# Patient Record
Sex: FEMALE | Race: WHITE | NOT HISPANIC OR LATINO | URBAN - METROPOLITAN AREA
[De-identification: names, ages, dates, MRNs, and addresses within clinical notes are randomized per-mention and may not be internally consistent; named-entity substitution may affect disease eponyms.]

---

## 2020-01-07 ENCOUNTER — INPATIENT (INPATIENT)
Facility: HOSPITAL | Age: 40
LOS: 2 days | Discharge: HOME | End: 2020-01-10
Attending: OBSTETRICS & GYNECOLOGY | Admitting: OBSTETRICS & GYNECOLOGY
Payer: COMMERCIAL

## 2020-01-07 VITALS
HEART RATE: 86 BPM | SYSTOLIC BLOOD PRESSURE: 169 MMHG | RESPIRATION RATE: 18 BRPM | DIASTOLIC BLOOD PRESSURE: 88 MMHG | TEMPERATURE: 99 F

## 2020-01-07 DIAGNOSIS — Z3A.00 WEEKS OF GESTATION OF PREGNANCY NOT SPECIFIED: ICD-10-CM

## 2020-01-07 DIAGNOSIS — O26.899 OTHER SPECIFIED PREGNANCY RELATED CONDITIONS, UNSPECIFIED TRIMESTER: ICD-10-CM

## 2020-01-07 DIAGNOSIS — O41.00X0 OLIGOHYDRAMNIOS, UNSPECIFIED TRIMESTER, NOT APPLICABLE OR UNSPECIFIED: ICD-10-CM

## 2020-01-07 DIAGNOSIS — O16.9 UNSPECIFIED MATERNAL HYPERTENSION, UNSPECIFIED TRIMESTER: ICD-10-CM

## 2020-01-07 LAB
ALBUMIN SERPL ELPH-MCNC: 3.6 G/DL — SIGNIFICANT CHANGE UP (ref 3.3–5)
ALBUMIN SERPL ELPH-MCNC: 4 G/DL — SIGNIFICANT CHANGE UP (ref 3.3–5)
ALP SERPL-CCNC: 231 U/L — HIGH (ref 40–120)
ALP SERPL-CCNC: 239 U/L — HIGH (ref 40–120)
ALT FLD-CCNC: 13 U/L — SIGNIFICANT CHANGE UP (ref 4–33)
ALT FLD-CCNC: 15 U/L — SIGNIFICANT CHANGE UP (ref 4–33)
ANION GAP SERPL CALC-SCNC: 12 MMO/L — SIGNIFICANT CHANGE UP (ref 7–14)
ANION GAP SERPL CALC-SCNC: 12 MMO/L — SIGNIFICANT CHANGE UP (ref 7–14)
APPEARANCE UR: SIGNIFICANT CHANGE UP
APTT BLD: 25 SEC — LOW (ref 27.5–36.3)
APTT BLD: 28.2 SEC — SIGNIFICANT CHANGE UP (ref 27.5–36.3)
AST SERPL-CCNC: 21 U/L — SIGNIFICANT CHANGE UP (ref 4–32)
AST SERPL-CCNC: 26 U/L — SIGNIFICANT CHANGE UP (ref 4–32)
BACTERIA # UR AUTO: HIGH
BASOPHILS # BLD AUTO: 0.02 K/UL — SIGNIFICANT CHANGE UP (ref 0–0.2)
BASOPHILS # BLD AUTO: 0.03 K/UL — SIGNIFICANT CHANGE UP (ref 0–0.2)
BASOPHILS NFR BLD AUTO: 0.2 % — SIGNIFICANT CHANGE UP (ref 0–2)
BASOPHILS NFR BLD AUTO: 0.2 % — SIGNIFICANT CHANGE UP (ref 0–2)
BILIRUB SERPL-MCNC: 0.3 MG/DL — SIGNIFICANT CHANGE UP (ref 0.2–1.2)
BILIRUB SERPL-MCNC: 0.4 MG/DL — SIGNIFICANT CHANGE UP (ref 0.2–1.2)
BILIRUB UR-MCNC: NEGATIVE — SIGNIFICANT CHANGE UP
BLD GP AB SCN SERPL QL: NEGATIVE — SIGNIFICANT CHANGE UP
BLOOD UR QL VISUAL: SIGNIFICANT CHANGE UP
BUN SERPL-MCNC: 7 MG/DL — SIGNIFICANT CHANGE UP (ref 7–23)
BUN SERPL-MCNC: 9 MG/DL — SIGNIFICANT CHANGE UP (ref 7–23)
CALCIUM SERPL-MCNC: 8.1 MG/DL — LOW (ref 8.4–10.5)
CALCIUM SERPL-MCNC: 9.9 MG/DL — SIGNIFICANT CHANGE UP (ref 8.4–10.5)
CHLORIDE SERPL-SCNC: 92 MMOL/L — LOW (ref 98–107)
CHLORIDE SERPL-SCNC: 92 MMOL/L — LOW (ref 98–107)
CO2 SERPL-SCNC: 20 MMOL/L — LOW (ref 22–31)
CO2 SERPL-SCNC: 22 MMOL/L — SIGNIFICANT CHANGE UP (ref 22–31)
COLOR SPEC: YELLOW — SIGNIFICANT CHANGE UP
CREAT ?TM UR-MCNC: 36.3 MG/DL — SIGNIFICANT CHANGE UP
CREAT SERPL-MCNC: 0.49 MG/DL — LOW (ref 0.5–1.3)
CREAT SERPL-MCNC: 0.52 MG/DL — SIGNIFICANT CHANGE UP (ref 0.5–1.3)
EOSINOPHIL # BLD AUTO: 0 K/UL — SIGNIFICANT CHANGE UP (ref 0–0.5)
EOSINOPHIL # BLD AUTO: 0.01 K/UL — SIGNIFICANT CHANGE UP (ref 0–0.5)
EOSINOPHIL NFR BLD AUTO: 0 % — SIGNIFICANT CHANGE UP (ref 0–6)
EOSINOPHIL NFR BLD AUTO: 0.1 % — SIGNIFICANT CHANGE UP (ref 0–6)
FIBRINOGEN PPP-MCNC: 457 MG/DL — SIGNIFICANT CHANGE UP (ref 350–510)
FIBRINOGEN PPP-MCNC: 574 MG/DL — HIGH (ref 350–510)
GLUCOSE SERPL-MCNC: 77 MG/DL — SIGNIFICANT CHANGE UP (ref 70–99)
GLUCOSE SERPL-MCNC: 86 MG/DL — SIGNIFICANT CHANGE UP (ref 70–99)
GLUCOSE UR-MCNC: NEGATIVE — SIGNIFICANT CHANGE UP
HCT VFR BLD CALC: 33.9 % — LOW (ref 34.5–45)
HCT VFR BLD CALC: 37.7 % — SIGNIFICANT CHANGE UP (ref 34.5–45)
HGB BLD-MCNC: 11.6 G/DL — SIGNIFICANT CHANGE UP (ref 11.5–15.5)
HGB BLD-MCNC: 12.9 G/DL — SIGNIFICANT CHANGE UP (ref 11.5–15.5)
HYALINE CASTS # UR AUTO: SIGNIFICANT CHANGE UP
IMM GRANULOCYTES NFR BLD AUTO: 0.4 % — SIGNIFICANT CHANGE UP (ref 0–1.5)
IMM GRANULOCYTES NFR BLD AUTO: 0.4 % — SIGNIFICANT CHANGE UP (ref 0–1.5)
INR BLD: 0.84 — LOW (ref 0.88–1.17)
INR BLD: 0.86 — LOW (ref 0.88–1.17)
KETONES UR-MCNC: SIGNIFICANT CHANGE UP
LDH SERPL L TO P-CCNC: 187 U/L — SIGNIFICANT CHANGE UP (ref 135–225)
LDH SERPL L TO P-CCNC: 207 U/L — SIGNIFICANT CHANGE UP (ref 135–225)
LEUKOCYTE ESTERASE UR-ACNC: NEGATIVE — SIGNIFICANT CHANGE UP
LYMPHOCYTES # BLD AUTO: 1.7 K/UL — SIGNIFICANT CHANGE UP (ref 1–3.3)
LYMPHOCYTES # BLD AUTO: 1.98 K/UL — SIGNIFICANT CHANGE UP (ref 1–3.3)
LYMPHOCYTES # BLD AUTO: 14 % — SIGNIFICANT CHANGE UP (ref 13–44)
LYMPHOCYTES # BLD AUTO: 14.4 % — SIGNIFICANT CHANGE UP (ref 13–44)
MAGNESIUM SERPL-MCNC: 4.7 MG/DL — HIGH (ref 1.6–2.6)
MCHC RBC-ENTMCNC: 32.9 PG — SIGNIFICANT CHANGE UP (ref 27–34)
MCHC RBC-ENTMCNC: 33 PG — SIGNIFICANT CHANGE UP (ref 27–34)
MCHC RBC-ENTMCNC: 34.2 % — SIGNIFICANT CHANGE UP (ref 32–36)
MCHC RBC-ENTMCNC: 34.2 % — SIGNIFICANT CHANGE UP (ref 32–36)
MCV RBC AUTO: 96.2 FL — SIGNIFICANT CHANGE UP (ref 80–100)
MCV RBC AUTO: 96.3 FL — SIGNIFICANT CHANGE UP (ref 80–100)
MONOCYTES # BLD AUTO: 0.58 K/UL — SIGNIFICANT CHANGE UP (ref 0–0.9)
MONOCYTES # BLD AUTO: 0.66 K/UL — SIGNIFICANT CHANGE UP (ref 0–0.9)
MONOCYTES NFR BLD AUTO: 4.2 % — SIGNIFICANT CHANGE UP (ref 2–14)
MONOCYTES NFR BLD AUTO: 5.5 % — SIGNIFICANT CHANGE UP (ref 2–14)
NEUTROPHILS # BLD AUTO: 11.14 K/UL — HIGH (ref 1.8–7.4)
NEUTROPHILS # BLD AUTO: 9.66 K/UL — HIGH (ref 1.8–7.4)
NEUTROPHILS NFR BLD AUTO: 79.8 % — HIGH (ref 43–77)
NEUTROPHILS NFR BLD AUTO: 80.8 % — HIGH (ref 43–77)
NITRITE UR-MCNC: NEGATIVE — SIGNIFICANT CHANGE UP
NRBC # FLD: 0 K/UL — SIGNIFICANT CHANGE UP (ref 0–0)
NRBC # FLD: 0 K/UL — SIGNIFICANT CHANGE UP (ref 0–0)
PH UR: 7.5 — SIGNIFICANT CHANGE UP (ref 5–8)
PLATELET # BLD AUTO: 198 K/UL — SIGNIFICANT CHANGE UP (ref 150–400)
PLATELET # BLD AUTO: 205 K/UL — SIGNIFICANT CHANGE UP (ref 150–400)
PMV BLD: 12.6 FL — SIGNIFICANT CHANGE UP (ref 7–13)
PMV BLD: 13 FL — SIGNIFICANT CHANGE UP (ref 7–13)
POTASSIUM SERPL-MCNC: 3.9 MMOL/L — SIGNIFICANT CHANGE UP (ref 3.5–5.3)
POTASSIUM SERPL-MCNC: 4.3 MMOL/L — SIGNIFICANT CHANGE UP (ref 3.5–5.3)
POTASSIUM SERPL-SCNC: 3.9 MMOL/L — SIGNIFICANT CHANGE UP (ref 3.5–5.3)
POTASSIUM SERPL-SCNC: 4.3 MMOL/L — SIGNIFICANT CHANGE UP (ref 3.5–5.3)
PROT SERPL-MCNC: 6.4 G/DL — SIGNIFICANT CHANGE UP (ref 6–8.3)
PROT SERPL-MCNC: 7 G/DL — SIGNIFICANT CHANGE UP (ref 6–8.3)
PROT UR-MCNC: 10 — SIGNIFICANT CHANGE UP
PROT UR-MCNC: 17.1 MG/DL — SIGNIFICANT CHANGE UP
PROTHROM AB SERPL-ACNC: 9.5 SEC — LOW (ref 9.8–13.1)
PROTHROM AB SERPL-ACNC: 9.7 SEC — LOW (ref 9.8–13.1)
RBC # BLD: 3.52 M/UL — LOW (ref 3.8–5.2)
RBC # BLD: 3.92 M/UL — SIGNIFICANT CHANGE UP (ref 3.8–5.2)
RBC # FLD: 11.9 % — SIGNIFICANT CHANGE UP (ref 10.3–14.5)
RBC # FLD: 11.9 % — SIGNIFICANT CHANGE UP (ref 10.3–14.5)
RBC CASTS # UR COMP ASSIST: SIGNIFICANT CHANGE UP (ref 0–?)
RH IG SCN BLD-IMP: POSITIVE — SIGNIFICANT CHANGE UP
RH IG SCN BLD-IMP: POSITIVE — SIGNIFICANT CHANGE UP
SODIUM SERPL-SCNC: 124 MMOL/L — LOW (ref 135–145)
SODIUM SERPL-SCNC: 126 MMOL/L — LOW (ref 135–145)
SP GR SPEC: 1.01 — SIGNIFICANT CHANGE UP (ref 1–1.04)
SQUAMOUS # UR AUTO: SIGNIFICANT CHANGE UP
URATE SERPL-MCNC: 3 MG/DL — SIGNIFICANT CHANGE UP (ref 2.5–7)
URATE SERPL-MCNC: 3.4 MG/DL — SIGNIFICANT CHANGE UP (ref 2.5–7)
UROBILINOGEN FLD QL: NORMAL — SIGNIFICANT CHANGE UP
WBC # BLD: 12.1 K/UL — HIGH (ref 3.8–10.5)
WBC # BLD: 13.79 K/UL — HIGH (ref 3.8–10.5)
WBC # FLD AUTO: 12.1 K/UL — HIGH (ref 3.8–10.5)
WBC # FLD AUTO: 13.79 K/UL — HIGH (ref 3.8–10.5)
WBC UR QL: HIGH (ref 0–?)

## 2020-01-07 RX ORDER — MAGNESIUM SULFATE 500 MG/ML
2 VIAL (ML) INJECTION
Qty: 40 | Refills: 0 | Status: DISCONTINUED | OUTPATIENT
Start: 2020-01-07 | End: 2020-01-09

## 2020-01-07 RX ORDER — SODIUM CHLORIDE 9 MG/ML
1000 INJECTION INTRAMUSCULAR; INTRAVENOUS; SUBCUTANEOUS
Refills: 0 | Status: DISCONTINUED | OUTPATIENT
Start: 2020-01-07 | End: 2020-01-08

## 2020-01-07 RX ORDER — LABETALOL HCL 100 MG
20 TABLET ORAL ONCE
Refills: 0 | Status: COMPLETED | OUTPATIENT
Start: 2020-01-07 | End: 2020-01-07

## 2020-01-07 RX ORDER — MAGNESIUM SULFATE 500 MG/ML
4 VIAL (ML) INJECTION ONCE
Refills: 0 | Status: COMPLETED | OUTPATIENT
Start: 2020-01-07 | End: 2020-01-07

## 2020-01-07 RX ORDER — OXYTOCIN 10 UNIT/ML
333.33 VIAL (ML) INJECTION
Qty: 20 | Refills: 0 | Status: DISCONTINUED | OUTPATIENT
Start: 2020-01-07 | End: 2020-01-08

## 2020-01-07 RX ORDER — SODIUM CHLORIDE 9 MG/ML
1000 INJECTION, SOLUTION INTRAVENOUS
Refills: 0 | Status: DISCONTINUED | OUTPATIENT
Start: 2020-01-07 | End: 2020-01-07

## 2020-01-07 RX ORDER — LABETALOL HCL 100 MG
200 TABLET ORAL THREE TIMES A DAY
Refills: 0 | Status: DISCONTINUED | OUTPATIENT
Start: 2020-01-07 | End: 2020-01-10

## 2020-01-07 RX ADMIN — Medication 300 GRAM(S): at 13:51

## 2020-01-07 RX ADMIN — Medication 50 GM/HR: at 14:17

## 2020-01-07 RX ADMIN — SODIUM CHLORIDE 50 MILLILITER(S): 9 INJECTION INTRAMUSCULAR; INTRAVENOUS; SUBCUTANEOUS at 23:15

## 2020-01-07 RX ADMIN — Medication 20 MILLIGRAM(S): at 13:36

## 2020-01-07 RX ADMIN — SODIUM CHLORIDE 50 MILLILITER(S): 9 INJECTION, SOLUTION INTRAVENOUS at 15:20

## 2020-01-07 RX ADMIN — Medication 50 GM/HR: at 19:09

## 2020-01-07 NOTE — OB PROVIDER H&P - HISTORY OF PRESENT ILLNESS
38 yo white female  G1 O0 @ 40 wks GA presents from Primary OB's office for NST/BPP due to an CIERA at 2.0 on Ultrasound in office. Pt denies any VB, LOF, and reports good FM's  PNC: Dr Obrien  Patient offers no Antepartum complaints and denies MSH

## 2020-01-07 NOTE — OB PROVIDER H&P - NSHPLABSRESULTS_GEN_ALL_CORE
HEL Labs  01-07    126<L>  |  92<L>  |  9   ----------------------------<  77  4.3   |  22  |  0.52    Ca    9.9      07 Jan 2020 13:40    TPro  7.0  /  Alb  4.0  /  TBili  0.4  /  DBili  x   /  AST  26  /  ALT  15  /  AlkPhos  239<H>  01-07

## 2020-01-07 NOTE — OB PROVIDER H&P - ASSESSMENT
40 yo Female  at 40.0 GA with oligohydramnios and severe preeclampsia for IOL  - DW Dr Alejandra (OB Attending)  - BP monitoring   - Antihypertensives  - Magnesium for seizure precautions  - PO Cyto for IOL

## 2020-01-07 NOTE — OB PROVIDER H&P - NSHPPHYSICALEXAM_GEN_ALL_CORE
A/O x 3  NAD  Vital Signs Last 24 Hrs  T(C): 36.4 (07 Jan 2020 14:34), Max: 37.1 (07 Jan 2020 12:37)  T(F): 97.5 (07 Jan 2020 14:34), Max: 98.8 (07 Jan 2020 12:37)  HR: 65 (07 Jan 2020 14:58) (62 - 99)  BP: 113/75 (07 Jan 2020 14:58) (95/67 - 169/88)  BP(mean): --  RR: 14 (07 Jan 2020 14:34) (14 - 18)  SpO2: 100% (07 Jan 2020 14:54) (99% - 100%)  Heart: RRR  Lungs: BCTA  Abdomen is soft NT and gravid with no ctx palpable   TAS: Confirms vertex position and Total CIERA of 2.0 x 1 pocket only otherwise BPP 6/8  NST in progress  B/L Hyper reflexia  No pedal edema noted

## 2020-01-08 ENCOUNTER — TRANSCRIPTION ENCOUNTER (OUTPATIENT)
Age: 40
End: 2020-01-08

## 2020-01-08 DIAGNOSIS — E87.1 HYPO-OSMOLALITY AND HYPONATREMIA: ICD-10-CM

## 2020-01-08 LAB
ALBUMIN SERPL ELPH-MCNC: 3.4 G/DL — SIGNIFICANT CHANGE UP (ref 3.3–5)
ALBUMIN SERPL ELPH-MCNC: 3.6 G/DL — SIGNIFICANT CHANGE UP (ref 3.3–5)
ALBUMIN SERPL ELPH-MCNC: 3.7 G/DL — SIGNIFICANT CHANGE UP (ref 3.3–5)
ALP SERPL-CCNC: 202 U/L — HIGH (ref 40–120)
ALP SERPL-CCNC: 222 U/L — HIGH (ref 40–120)
ALP SERPL-CCNC: 237 U/L — HIGH (ref 40–120)
ALT FLD-CCNC: 13 U/L — SIGNIFICANT CHANGE UP (ref 4–33)
ALT FLD-CCNC: 14 U/L — SIGNIFICANT CHANGE UP (ref 4–33)
ALT FLD-CCNC: 14 U/L — SIGNIFICANT CHANGE UP (ref 4–33)
ANION GAP SERPL CALC-SCNC: 11 MMO/L — SIGNIFICANT CHANGE UP (ref 7–14)
ANION GAP SERPL CALC-SCNC: 14 MMO/L — SIGNIFICANT CHANGE UP (ref 7–14)
ANION GAP SERPL CALC-SCNC: 15 MMO/L — HIGH (ref 7–14)
APTT BLD: 25 SEC — LOW (ref 27.5–36.3)
AST SERPL-CCNC: 23 U/L — SIGNIFICANT CHANGE UP (ref 4–32)
AST SERPL-CCNC: 24 U/L — SIGNIFICANT CHANGE UP (ref 4–32)
AST SERPL-CCNC: 32 U/L — SIGNIFICANT CHANGE UP (ref 4–32)
BASOPHILS # BLD AUTO: 0.02 K/UL — SIGNIFICANT CHANGE UP (ref 0–0.2)
BASOPHILS # BLD AUTO: 0.03 K/UL — SIGNIFICANT CHANGE UP (ref 0–0.2)
BASOPHILS # BLD AUTO: 0.04 K/UL — SIGNIFICANT CHANGE UP (ref 0–0.2)
BASOPHILS NFR BLD AUTO: 0.1 % — SIGNIFICANT CHANGE UP (ref 0–2)
BASOPHILS NFR BLD AUTO: 0.2 % — SIGNIFICANT CHANGE UP (ref 0–2)
BASOPHILS NFR BLD AUTO: 0.3 % — SIGNIFICANT CHANGE UP (ref 0–2)
BILIRUB SERPL-MCNC: 0.4 MG/DL — SIGNIFICANT CHANGE UP (ref 0.2–1.2)
BILIRUB SERPL-MCNC: 0.6 MG/DL — SIGNIFICANT CHANGE UP (ref 0.2–1.2)
BILIRUB SERPL-MCNC: 0.6 MG/DL — SIGNIFICANT CHANGE UP (ref 0.2–1.2)
BUN SERPL-MCNC: 6 MG/DL — LOW (ref 7–23)
BUN SERPL-MCNC: 7 MG/DL — SIGNIFICANT CHANGE UP (ref 7–23)
BUN SERPL-MCNC: 8 MG/DL — SIGNIFICANT CHANGE UP (ref 7–23)
CALCIUM SERPL-MCNC: 7.1 MG/DL — LOW (ref 8.4–10.5)
CALCIUM SERPL-MCNC: 7.5 MG/DL — LOW (ref 8.4–10.5)
CALCIUM SERPL-MCNC: 7.6 MG/DL — LOW (ref 8.4–10.5)
CHLORIDE SERPL-SCNC: 88 MMOL/L — LOW (ref 98–107)
CHLORIDE SERPL-SCNC: 90 MMOL/L — LOW (ref 98–107)
CHLORIDE SERPL-SCNC: 91 MMOL/L — LOW (ref 98–107)
CO2 SERPL-SCNC: 18 MMOL/L — LOW (ref 22–31)
CO2 SERPL-SCNC: 21 MMOL/L — LOW (ref 22–31)
CO2 SERPL-SCNC: 21 MMOL/L — LOW (ref 22–31)
CORTIS SERPL-MCNC: 36.5 UG/DL — HIGH (ref 2.7–18.4)
CREAT SERPL-MCNC: 0.49 MG/DL — LOW (ref 0.5–1.3)
CREAT SERPL-MCNC: 0.51 MG/DL — SIGNIFICANT CHANGE UP (ref 0.5–1.3)
CREAT SERPL-MCNC: 0.54 MG/DL — SIGNIFICANT CHANGE UP (ref 0.5–1.3)
EOSINOPHIL # BLD AUTO: 0 K/UL — SIGNIFICANT CHANGE UP (ref 0–0.5)
EOSINOPHIL # BLD AUTO: 0.01 K/UL — SIGNIFICANT CHANGE UP (ref 0–0.5)
EOSINOPHIL # BLD AUTO: 0.02 K/UL — SIGNIFICANT CHANGE UP (ref 0–0.5)
EOSINOPHIL NFR BLD AUTO: 0 % — SIGNIFICANT CHANGE UP (ref 0–6)
EOSINOPHIL NFR BLD AUTO: 0.1 % — SIGNIFICANT CHANGE UP (ref 0–6)
EOSINOPHIL NFR BLD AUTO: 0.1 % — SIGNIFICANT CHANGE UP (ref 0–6)
FIBRINOGEN PPP-MCNC: 553 MG/DL — HIGH (ref 350–510)
FIBRINOGEN PPP-MCNC: 556.4 MG/DL — HIGH (ref 350–510)
FIBRINOGEN PPP-MCNC: 594 MG/DL — HIGH (ref 350–510)
GLUCOSE SERPL-MCNC: 133 MG/DL — HIGH (ref 70–99)
GLUCOSE SERPL-MCNC: 89 MG/DL — SIGNIFICANT CHANGE UP (ref 70–99)
GLUCOSE SERPL-MCNC: 97 MG/DL — SIGNIFICANT CHANGE UP (ref 70–99)
HCT VFR BLD CALC: 31.1 % — LOW (ref 34.5–45)
HCT VFR BLD CALC: 34.1 % — LOW (ref 34.5–45)
HCT VFR BLD CALC: 34.7 % — SIGNIFICANT CHANGE UP (ref 34.5–45)
HGB BLD-MCNC: 10.8 G/DL — LOW (ref 11.5–15.5)
HGB BLD-MCNC: 11.6 G/DL — SIGNIFICANT CHANGE UP (ref 11.5–15.5)
HGB BLD-MCNC: 12.2 G/DL — SIGNIFICANT CHANGE UP (ref 11.5–15.5)
IMM GRANULOCYTES NFR BLD AUTO: 0.5 % — SIGNIFICANT CHANGE UP (ref 0–1.5)
IMM GRANULOCYTES NFR BLD AUTO: 0.5 % — SIGNIFICANT CHANGE UP (ref 0–1.5)
IMM GRANULOCYTES NFR BLD AUTO: 0.6 % — SIGNIFICANT CHANGE UP (ref 0–1.5)
INR BLD: 0.87 — LOW (ref 0.88–1.17)
INR BLD: 0.88 — SIGNIFICANT CHANGE UP (ref 0.88–1.17)
INR BLD: 0.89 — SIGNIFICANT CHANGE UP (ref 0.88–1.17)
LDH SERPL L TO P-CCNC: 185 U/L — SIGNIFICANT CHANGE UP (ref 135–225)
LDH SERPL L TO P-CCNC: 191 U/L — SIGNIFICANT CHANGE UP (ref 135–225)
LDH SERPL L TO P-CCNC: 236 U/L — HIGH (ref 135–225)
LYMPHOCYTES # BLD AUTO: 1.08 K/UL — SIGNIFICANT CHANGE UP (ref 1–3.3)
LYMPHOCYTES # BLD AUTO: 1.65 K/UL — SIGNIFICANT CHANGE UP (ref 1–3.3)
LYMPHOCYTES # BLD AUTO: 12.1 % — LOW (ref 13–44)
LYMPHOCYTES # BLD AUTO: 16.3 % — SIGNIFICANT CHANGE UP (ref 13–44)
LYMPHOCYTES # BLD AUTO: 2.4 K/UL — SIGNIFICANT CHANGE UP (ref 1–3.3)
LYMPHOCYTES # BLD AUTO: 6.5 % — LOW (ref 13–44)
MAGNESIUM SERPL-MCNC: 5.6 MG/DL — HIGH (ref 1.6–2.6)
MAGNESIUM SERPL-MCNC: 5.6 MG/DL — HIGH (ref 1.6–2.6)
MAGNESIUM SERPL-MCNC: 6 MG/DL — HIGH (ref 1.6–2.6)
MAGNESIUM SERPL-MCNC: 6 MG/DL — HIGH (ref 1.6–2.6)
MCHC RBC-ENTMCNC: 32.6 PG — SIGNIFICANT CHANGE UP (ref 27–34)
MCHC RBC-ENTMCNC: 32.7 PG — SIGNIFICANT CHANGE UP (ref 27–34)
MCHC RBC-ENTMCNC: 32.8 PG — SIGNIFICANT CHANGE UP (ref 27–34)
MCHC RBC-ENTMCNC: 34 % — SIGNIFICANT CHANGE UP (ref 32–36)
MCHC RBC-ENTMCNC: 34.7 % — SIGNIFICANT CHANGE UP (ref 32–36)
MCHC RBC-ENTMCNC: 35.2 % — SIGNIFICANT CHANGE UP (ref 32–36)
MCV RBC AUTO: 92.8 FL — SIGNIFICANT CHANGE UP (ref 80–100)
MCV RBC AUTO: 94.5 FL — SIGNIFICANT CHANGE UP (ref 80–100)
MCV RBC AUTO: 96.1 FL — SIGNIFICANT CHANGE UP (ref 80–100)
MONOCYTES # BLD AUTO: 0.64 K/UL — SIGNIFICANT CHANGE UP (ref 0–0.9)
MONOCYTES # BLD AUTO: 0.67 K/UL — SIGNIFICANT CHANGE UP (ref 0–0.9)
MONOCYTES # BLD AUTO: 0.86 K/UL — SIGNIFICANT CHANGE UP (ref 0–0.9)
MONOCYTES NFR BLD AUTO: 3.9 % — SIGNIFICANT CHANGE UP (ref 2–14)
MONOCYTES NFR BLD AUTO: 4.9 % — SIGNIFICANT CHANGE UP (ref 2–14)
MONOCYTES NFR BLD AUTO: 5.8 % — SIGNIFICANT CHANGE UP (ref 2–14)
NEUTROPHILS # BLD AUTO: 11.25 K/UL — HIGH (ref 1.8–7.4)
NEUTROPHILS # BLD AUTO: 11.32 K/UL — HIGH (ref 1.8–7.4)
NEUTROPHILS # BLD AUTO: 14.8 K/UL — HIGH (ref 1.8–7.4)
NEUTROPHILS NFR BLD AUTO: 77 % — SIGNIFICANT CHANGE UP (ref 43–77)
NEUTROPHILS NFR BLD AUTO: 82.1 % — HIGH (ref 43–77)
NEUTROPHILS NFR BLD AUTO: 89 % — HIGH (ref 43–77)
NRBC # FLD: 0 K/UL — SIGNIFICANT CHANGE UP (ref 0–0)
OSMOLALITY SERPL: 260 MOSMO/KG — LOW (ref 275–295)
OSMOLALITY UR: 379 MOSMO/KG — SIGNIFICANT CHANGE UP (ref 50–1200)
PLATELET # BLD AUTO: 185 K/UL — SIGNIFICANT CHANGE UP (ref 150–400)
PLATELET # BLD AUTO: 192 K/UL — SIGNIFICANT CHANGE UP (ref 150–400)
PLATELET # BLD AUTO: 203 K/UL — SIGNIFICANT CHANGE UP (ref 150–400)
PMV BLD: 12.4 FL — SIGNIFICANT CHANGE UP (ref 7–13)
PMV BLD: 12.4 FL — SIGNIFICANT CHANGE UP (ref 7–13)
PMV BLD: 12.7 FL — SIGNIFICANT CHANGE UP (ref 7–13)
POTASSIUM SERPL-MCNC: 3.7 MMOL/L — SIGNIFICANT CHANGE UP (ref 3.5–5.3)
POTASSIUM SERPL-MCNC: 4.1 MMOL/L — SIGNIFICANT CHANGE UP (ref 3.5–5.3)
POTASSIUM SERPL-MCNC: 4.3 MMOL/L — SIGNIFICANT CHANGE UP (ref 3.5–5.3)
POTASSIUM SERPL-SCNC: 3.7 MMOL/L — SIGNIFICANT CHANGE UP (ref 3.5–5.3)
POTASSIUM SERPL-SCNC: 4.1 MMOL/L — SIGNIFICANT CHANGE UP (ref 3.5–5.3)
POTASSIUM SERPL-SCNC: 4.3 MMOL/L — SIGNIFICANT CHANGE UP (ref 3.5–5.3)
PROT SERPL-MCNC: 5.8 G/DL — LOW (ref 6–8.3)
PROT SERPL-MCNC: 6.1 G/DL — SIGNIFICANT CHANGE UP (ref 6–8.3)
PROT SERPL-MCNC: 6.6 G/DL — SIGNIFICANT CHANGE UP (ref 6–8.3)
PROTHROM AB SERPL-ACNC: 10 SEC — SIGNIFICANT CHANGE UP (ref 9.8–13.1)
PROTHROM AB SERPL-ACNC: 9.9 SEC — SIGNIFICANT CHANGE UP (ref 9.8–13.1)
PROTHROM AB SERPL-ACNC: 9.9 SEC — SIGNIFICANT CHANGE UP (ref 9.8–13.1)
RBC # BLD: 3.29 M/UL — LOW (ref 3.8–5.2)
RBC # BLD: 3.55 M/UL — LOW (ref 3.8–5.2)
RBC # BLD: 3.74 M/UL — LOW (ref 3.8–5.2)
RBC # FLD: 11.9 % — SIGNIFICANT CHANGE UP (ref 10.3–14.5)
SODIUM SERPL-SCNC: 123 MMOL/L — LOW (ref 135–145)
T PALLIDUM AB TITR SER: NEGATIVE — SIGNIFICANT CHANGE UP
T3 SERPL-MCNC: 77.1 NG/DL — LOW (ref 80–200)
T4 AB SER-ACNC: 8.18 UG/DL — SIGNIFICANT CHANGE UP (ref 5.1–13)
TSH SERPL-MCNC: 0.92 UIU/ML — SIGNIFICANT CHANGE UP (ref 0.27–4.2)
TSH SERPL-MCNC: 0.94 UIU/ML — SIGNIFICANT CHANGE UP (ref 0.27–4.2)
URATE SERPL-MCNC: 3.5 MG/DL — SIGNIFICANT CHANGE UP (ref 2.5–7)
URATE SERPL-MCNC: 3.6 MG/DL — SIGNIFICANT CHANGE UP (ref 2.5–7)
URATE SERPL-MCNC: 3.6 MG/DL — SIGNIFICANT CHANGE UP (ref 2.5–7)
WBC # BLD: 13.69 K/UL — HIGH (ref 3.8–10.5)
WBC # BLD: 14.71 K/UL — HIGH (ref 3.8–10.5)
WBC # BLD: 16.62 K/UL — HIGH (ref 3.8–10.5)
WBC # FLD AUTO: 13.69 K/UL — HIGH (ref 3.8–10.5)
WBC # FLD AUTO: 14.71 K/UL — HIGH (ref 3.8–10.5)
WBC # FLD AUTO: 16.62 K/UL — HIGH (ref 3.8–10.5)

## 2020-01-08 PROCEDURE — 99223 1ST HOSP IP/OBS HIGH 75: CPT

## 2020-01-08 PROCEDURE — 99254 IP/OBS CNSLTJ NEW/EST MOD 60: CPT

## 2020-01-08 RX ORDER — BENZOCAINE 10 %
1 GEL (GRAM) MUCOUS MEMBRANE EVERY 6 HOURS
Refills: 0 | Status: DISCONTINUED | OUTPATIENT
Start: 2020-01-08 | End: 2020-01-10

## 2020-01-08 RX ORDER — TETANUS TOXOID, REDUCED DIPHTHERIA TOXOID AND ACELLULAR PERTUSSIS VACCINE, ADSORBED 5; 2.5; 8; 8; 2.5 [IU]/.5ML; [IU]/.5ML; UG/.5ML; UG/.5ML; UG/.5ML
0.5 SUSPENSION INTRAMUSCULAR ONCE
Refills: 0 | Status: DISCONTINUED | OUTPATIENT
Start: 2020-01-08 | End: 2020-01-10

## 2020-01-08 RX ORDER — HYDROCORTISONE 1 %
1 OINTMENT (GRAM) TOPICAL EVERY 6 HOURS
Refills: 0 | Status: DISCONTINUED | OUTPATIENT
Start: 2020-01-08 | End: 2020-01-10

## 2020-01-08 RX ORDER — SODIUM CHLORIDE 9 MG/ML
1000 INJECTION INTRAMUSCULAR; INTRAVENOUS; SUBCUTANEOUS
Refills: 0 | Status: DISCONTINUED | OUTPATIENT
Start: 2020-01-08 | End: 2020-01-09

## 2020-01-08 RX ORDER — SIMETHICONE 80 MG/1
80 TABLET, CHEWABLE ORAL EVERY 4 HOURS
Refills: 0 | Status: DISCONTINUED | OUTPATIENT
Start: 2020-01-08 | End: 2020-01-10

## 2020-01-08 RX ORDER — LABETALOL HCL 100 MG
1 TABLET ORAL
Qty: 0 | Refills: 0 | DISCHARGE
Start: 2020-01-08

## 2020-01-08 RX ORDER — SODIUM CHLORIDE 9 MG/ML
3 INJECTION INTRAMUSCULAR; INTRAVENOUS; SUBCUTANEOUS EVERY 8 HOURS
Refills: 0 | Status: DISCONTINUED | OUTPATIENT
Start: 2020-01-08 | End: 2020-01-10

## 2020-01-08 RX ORDER — OXYCODONE HYDROCHLORIDE 5 MG/1
5 TABLET ORAL ONCE
Refills: 0 | Status: DISCONTINUED | OUTPATIENT
Start: 2020-01-08 | End: 2020-01-10

## 2020-01-08 RX ORDER — OXYCODONE HYDROCHLORIDE 5 MG/1
5 TABLET ORAL
Refills: 0 | Status: DISCONTINUED | OUTPATIENT
Start: 2020-01-08 | End: 2020-01-10

## 2020-01-08 RX ORDER — IBUPROFEN 200 MG
600 TABLET ORAL EVERY 6 HOURS
Refills: 0 | Status: DISCONTINUED | OUTPATIENT
Start: 2020-01-08 | End: 2020-01-10

## 2020-01-08 RX ORDER — MAGNESIUM HYDROXIDE 400 MG/1
30 TABLET, CHEWABLE ORAL
Refills: 0 | Status: DISCONTINUED | OUTPATIENT
Start: 2020-01-08 | End: 2020-01-10

## 2020-01-08 RX ORDER — ACETAMINOPHEN 500 MG
975 TABLET ORAL
Refills: 0 | Status: DISCONTINUED | OUTPATIENT
Start: 2020-01-08 | End: 2020-01-10

## 2020-01-08 RX ORDER — OXYTOCIN 10 UNIT/ML
333.33 VIAL (ML) INJECTION
Qty: 20 | Refills: 0 | Status: DISCONTINUED | OUTPATIENT
Start: 2020-01-08 | End: 2020-01-08

## 2020-01-08 RX ORDER — GLYCERIN ADULT
1 SUPPOSITORY, RECTAL RECTAL AT BEDTIME
Refills: 0 | Status: DISCONTINUED | OUTPATIENT
Start: 2020-01-08 | End: 2020-01-10

## 2020-01-08 RX ORDER — ACETAMINOPHEN 500 MG
3 TABLET ORAL
Qty: 0 | Refills: 0 | DISCHARGE
Start: 2020-01-08

## 2020-01-08 RX ORDER — OXYTOCIN 10 UNIT/ML
333.33 VIAL (ML) INJECTION
Qty: 20 | Refills: 0 | Status: DISCONTINUED | OUTPATIENT
Start: 2020-01-08 | End: 2020-01-10

## 2020-01-08 RX ORDER — PRAMOXINE HYDROCHLORIDE 150 MG/15G
1 AEROSOL, FOAM RECTAL EVERY 4 HOURS
Refills: 0 | Status: DISCONTINUED | OUTPATIENT
Start: 2020-01-08 | End: 2020-01-10

## 2020-01-08 RX ORDER — KETOROLAC TROMETHAMINE 30 MG/ML
30 SYRINGE (ML) INJECTION ONCE
Refills: 0 | Status: DISCONTINUED | OUTPATIENT
Start: 2020-01-08 | End: 2020-01-08

## 2020-01-08 RX ORDER — DIPHENHYDRAMINE HCL 50 MG
25 CAPSULE ORAL EVERY 6 HOURS
Refills: 0 | Status: DISCONTINUED | OUTPATIENT
Start: 2020-01-08 | End: 2020-01-10

## 2020-01-08 RX ORDER — AER TRAVELER 0.5 G/1
1 SOLUTION RECTAL; TOPICAL EVERY 4 HOURS
Refills: 0 | Status: DISCONTINUED | OUTPATIENT
Start: 2020-01-08 | End: 2020-01-10

## 2020-01-08 RX ORDER — LANOLIN
1 OINTMENT (GRAM) TOPICAL EVERY 6 HOURS
Refills: 0 | Status: DISCONTINUED | OUTPATIENT
Start: 2020-01-08 | End: 2020-01-10

## 2020-01-08 RX ORDER — DIBUCAINE 1 %
1 OINTMENT (GRAM) RECTAL EVERY 6 HOURS
Refills: 0 | Status: DISCONTINUED | OUTPATIENT
Start: 2020-01-08 | End: 2020-01-10

## 2020-01-08 RX ORDER — IBUPROFEN 200 MG
1 TABLET ORAL
Qty: 0 | Refills: 0 | DISCHARGE
Start: 2020-01-08

## 2020-01-08 RX ADMIN — Medication 50 GM/HR: at 08:36

## 2020-01-08 RX ADMIN — Medication 1000 MILLIUNIT(S)/MIN: at 11:35

## 2020-01-08 RX ADMIN — Medication 200 MILLIGRAM(S): at 08:35

## 2020-01-08 RX ADMIN — Medication 30 MILLIGRAM(S): at 12:59

## 2020-01-08 RX ADMIN — SODIUM CHLORIDE 3 MILLILITER(S): 9 INJECTION INTRAMUSCULAR; INTRAVENOUS; SUBCUTANEOUS at 16:12

## 2020-01-08 RX ADMIN — SODIUM CHLORIDE 3 MILLILITER(S): 9 INJECTION INTRAMUSCULAR; INTRAVENOUS; SUBCUTANEOUS at 22:00

## 2020-01-08 RX ADMIN — Medication 50 GM/HR: at 16:38

## 2020-01-08 RX ADMIN — Medication 50 GM/HR: at 20:40

## 2020-01-08 RX ADMIN — Medication 30 MILLIGRAM(S): at 11:35

## 2020-01-08 NOTE — CHART NOTE - NSCHARTNOTEFT_GEN_A_CORE
Patient requesting pain medication  repeat sve 7/90/-1  Cat 1 fhr tracing, fhr 135 with mod variability, accels, no decels  contractions q2-5min    Vital Signs Last 24 Hrs  T(C): 36.7 (08 Jan 2020 06:12), Max: 37.1 (07 Jan 2020 12:37)  T(F): 98.06 (08 Jan 2020 06:12), Max: 98.8 (07 Jan 2020 12:37)  HR: 63 (08 Jan 2020 07:14) (57 - 99)  BP: 144/68 (08 Jan 2020 07:09) (95/67 - 169/88)  BP(mean): --  RR: 14 (07 Jan 2020 21:58) (14 - 18)  SpO2: 97% (08 Jan 2020 07:19) (89% - 100%)    d/w Dr. Obrien  pt approved for epidural  Dr. Werner en route  anesthesia called for epidural    FLORENCE Bosch NP

## 2020-01-08 NOTE — DISCHARGE NOTE OB - CARE PLAN
Principal Discharge DX:	Vaginal delivery  Goal:	recovery  Assessment and plan of treatment:	pelvic rest, regular diet  Secondary Diagnosis:	Pre-existing essential hypertension during pregnancy, antepartum  Goal:	normal BP  Assessment and plan of treatment:	labetalol 200 mg tid, follow up x 1 week for BP check  Secondary Diagnosis:	Oligohydramnios in third trimester, single or unspecified fetus  Goal:	delivery  Assessment and plan of treatment:

## 2020-01-08 NOTE — OB PROVIDER DELIVERY SUMMARY - NSPROVIDERDELIVERYNOTE_OBGYN_ALL_OB_FT
Patient FD and pushing.   live female over intact perineum.  Apgars 9+9.  Placenta spontaneous intact.  Uterus firm. Periurethral laceration repaired with 3-0 vicryl and 2nd degree perineal laceration repaired with 2-0 chromic under epidural.  .  Mother and baby in stable condition.  Continue magnesium sulfate x 24  hr  Continue po labetalol  Fluid restrict to 500 cc per day per dw MFM and medicine consult hyponatremia  CELESTE Obrien

## 2020-01-08 NOTE — CONSULT NOTE ADULT - PROBLEM SELECTOR RECOMMENDATION 9
-Please send urine osmolarity, serum osmolarity, urine sodium, TSH & cortisol   -Recommend d/c IVF as long as patient tolerating PO  -Fluid restriction to 1.0 L/day  -Monitor BMP q 12 for now; if no improvement by tomorrow, would start sodium chloride tablets.   -If any acute mental status changes or seizure activity, recommend hypertonic saline and MICU consult    d/w Dr. Smiley -Please send urine osmolarity, serum osmolarity, urine sodium, TSH & cortisol   -Recommend d/c IVF as long as patient tolerating PO  -Fluid restriction to 1.0 L/day  -Monitor BMP q 12 for now; if no improvement by tomorrow, would start sodium chloride tablets.   -If any acute mental status changes or seizure activity, recommend hypertonic saline and MICU consult  -can check with pharmacy able to make MgSO4 infusion with NS (instead of hypotonic fluid)    d/w Dr. Smiley

## 2020-01-08 NOTE — CHART NOTE - NSCHARTNOTEFT_GEN_A_CORE
cat 2 fhr tracing, fhr 135 with mod variability, no accels, early decels  contractions q3-4min    sve 10/100/0, arom clear fluid    Vital Signs Last 24 Hrs  T(C): 36.5 (08 Jan 2020 08:45), Max: 37.1 (07 Jan 2020 12:37)  T(F): 97.7 (08 Jan 2020 08:45), Max: 98.8 (07 Jan 2020 12:37)  HR: 92 (08 Jan 2020 09:34) (57 - 181)  BP: 136/78 (08 Jan 2020 09:34) (95/67 - 193/74)  BP(mean): --  RR: 14 (07 Jan 2020 21:58) (14 - 18)  SpO2: 98% (08 Jan 2020 09:29) (74% - 100%)    Dr. Downs at bedside discussing dx of hyponatremia with the patient  plan at this time is to fluid restrict after delivery to 500ml per day and consult medicine    FLORENCE Bosch NP cat 2 fhr tracing, fhr 135 with mod variability, no accels, early decels  contractions q3-4min    sve 10/100/0, arom clear fluid    Vital Signs Last 24 Hrs  T(C): 36.5 (08 Jan 2020 08:45), Max: 37.1 (07 Jan 2020 12:37)  T(F): 97.7 (08 Jan 2020 08:45), Max: 98.8 (07 Jan 2020 12:37)  HR: 92 (08 Jan 2020 09:34) (57 - 181)  BP: 136/78 (08 Jan 2020 09:34) (95/67 - 193/74)  BP(mean): --  RR: 14 (07 Jan 2020 21:58) (14 - 18)  SpO2: 98% (08 Jan 2020 09:29) (74% - 100%)    01-08    123<L>  |  90<L>  |  7   ----------------------------<  97  3.7   |  18<L>  |  0.49<L>    Ca    7.5<L>      08 Jan 2020 07:28  Mg     5.6     01-08    TPro  6.6  /  Alb  3.7  /  TBili  0.6  /  DBili  x   /  AST  23  /  ALT  14  /  AlkPhos  237<H>  01-08      Dr. Downs at bedside discussing dx of hyponatremia with the patient  plan at this time is to fluid restrict after delivery to 500ml per day and consult medicine    FLORENCE Bosch NP

## 2020-01-08 NOTE — DISCHARGE NOTE OB - HOSPITAL COURSE
Patient admitted with oligohydramnios  BP elevated , received IVP labetalol and started po 200 tid  cytotec induction of labor   live female  repair periurethral and 2nd degree laceration

## 2020-01-08 NOTE — PROVIDER CONTACT NOTE (OTHER) - BACKGROUND
sPEC on mag, patient has hyponatremia on fluid restriction to 500ml daily. Patient ambulated to bathroom heart rate elevated to 135.

## 2020-01-08 NOTE — DISCHARGE NOTE OB - PLAN OF CARE
recovery pelvic rest, regular diet normal BP labetalol 200 mg tid, follow up x 1 week for BP check delivery

## 2020-01-08 NOTE — CONSULT NOTE ADULT - ASSESSMENT
Hyponatremia has been reported in many cases of preeclampsia and the etiology is unknown. SIADH is in the differential. Patient denies any neurologic symptoms and remains asymptomatic.  There is an increased seizure risk for mother and fetus.

## 2020-01-08 NOTE — CONSULT NOTE ADULT - SUBJECTIVE AND OBJECTIVE BOX
Patient is a 39y old  Female who presents with a chief complaint of     HPI:  40 yo white female  G1 O0 @ 40 wks GA presents from Primary OB's office for NST/BPP due to an CIERA at 2.0 on Ultrasound in office. Pt denies any VB, LOF, and reports good FM's. I am asked to see patient secondary to serum sodium of 123. On admisison sodium was 126. Patient also has preeclampsia with severe features on magnesium sulphate.        PAST MEDICAL & SURGICAL HISTORY:  No pertinent past medical history  No significant past surgical history      MEDICATIONS  (STANDING):  labetalol 200 milliGRAM(s) Oral three times a day  magnesium sulfate Infusion 2 Gm/Hr (50 mL/Hr) IV Continuous <Continuous>  oxytocin Infusion 333.333 milliUNIT(s)/Min (1000 mL/Hr) IV Continuous <Continuous>  sodium chloride 0.9%. 1000 milliLiter(s) (50 mL/Hr) IV Continuous <Continuous>              Vital Signs Last 24 Hrs  T(C): 36.5 (2020 08:45), Max: 37.1 (2020 12:37)  T(F): 97.7 (2020 08:45), Max: 98.8 (2020 12:37)  HR: 92 (2020 09:34) (57 - 181)  BP: 136/78 (2020 09:34) (95/67 - 193/74)  BP(mean): --  RR: 14 (2020 21:58) (14 - 18)  SpO2: 100% (2020 09:34) (74% - 100%)    PHYSICAL EXAM:          I&O's Summary    2020 07:01  -  2020 07:00  --------------------------------------------------------  IN: 1750 mL / OUT: 1450 mL / NET: 300 mL        LABORATORY:                        12.2   13.69 )-----------( 203      ( 2020 07:28 )             34.7     01-08    123<L>  |  90<L>  |  7   ----------------------------<  97  3.7   |  18<L>  |  0.49<L>    Ca    7.5<L>      2020 07:28  Mg     5.6     -08    TPro  6.6  /  Alb  3.7  /  TBili  0.6  /  DBili  x   /  AST  23  /  ALT  14  /  AlkPhos  237<H>  01-08    PT/INR - ( 2020 07:28 )   PT: 9.9 SEC;   INR: 0.87          PTT - ( 2020 02:00 )  PTT:25.0 SEC  Urinalysis Basic - ( 2020 15:20 )    Color: YELLOW / Appearance: Lt TURBID / S.012 / pH: 7.5  Gluc: NEGATIVE / Ketone: TRACE  / Bili: NEGATIVE / Urobili: NORMAL   Blood: SMALL / Protein: 10 / Nitrite: NEGATIVE   Leuk Esterase: NEGATIVE / RBC: 0-2 / WBC 11-25   Sq Epi: SMALL / Non Sq Epi: x / Bacteria: MANY

## 2020-01-08 NOTE — CONSULT NOTE ADULT - SUBJECTIVE AND OBJECTIVE BOX
HPI:  38 yo white female  G1 O0 @ 40 wks GA presents from Primary OB's office for NST/BPP due to an CIERA at 2.0 on Ultrasound in office. Pt denies any VB, LOF, and reports good FM's  PNC: Dr Obrien  Patient offers no Antepartum complaints and denies MSH (2020 14:56)      PAST MEDICAL & SURGICAL HISTORY:  No pertinent past medical history  No significant past surgical history      Review of Systems:   CONSTITUTIONAL: No fever, weight loss, or fatigue  EYES: No eye pain, visual disturbances, or discharge  ENMT:  No difficulty hearing, tinnitus, vertigo; No sinus or throat pain  NECK: No pain or stiffness  BREASTS: No pain, masses, or nipple discharge  RESPIRATORY: No cough, wheezing, chills or hemoptysis; No shortness of breath  CARDIOVASCULAR: No chest pain, palpitations, dizziness, or leg swelling  GASTROINTESTINAL: No abdominal or epigastric pain. No nausea, vomiting, or hematemesis; No diarrhea or constipation. No melena or hematochezia.  GENITOURINARY: No dysuria, frequency, hematuria, or incontinence  NEUROLOGICAL: No headaches, memory loss, loss of strength, numbness, or tremors  SKIN: No itching, burning, rashes, or lesions   LYMPH NODES: No enlarged glands  ENDOCRINE: No heat or cold intolerance; No hair loss  MUSCULOSKELETAL: No joint pain or swelling; No muscle, back, or extremity pain  PSYCHIATRIC: No depression, anxiety, mood swings, or difficulty sleeping  HEME/LYMPH: No easy bruising, or bleeding gums  ALLERY AND IMMUNOLOGIC: No hives or eczema    Allergies    No Known Allergies    Intolerances        Social History:     FAMILY HISTORY:      MEDICATIONS  (STANDING):  acetaminophen   Tablet .. 975 milliGRAM(s) Oral <User Schedule>  diphtheria/tetanus/pertussis (acellular) Vaccine (ADAcel) 0.5 milliLiter(s) IntraMuscular once  ibuprofen  Tablet. 600 milliGRAM(s) Oral every 6 hours  labetalol 200 milliGRAM(s) Oral three times a day  magnesium sulfate Infusion 2 Gm/Hr (50 mL/Hr) IV Continuous <Continuous>  oxytocin Infusion 333.333 milliUNIT(s)/Min (999.999 mL/Hr) IV Continuous <Continuous>  prenatal multivitamin 1 Tablet(s) Oral daily  sodium chloride 0.9% lock flush 3 milliLiter(s) IV Push every 8 hours  sodium chloride 0.9%. 1000 milliLiter(s) (50 mL/Hr) IV Continuous <Continuous>    MEDICATIONS  (PRN):  benzocaine 20%/menthol 0.5% Spray 1 Spray(s) Topical every 6 hours PRN for Perineal discomfort  dibucaine 1% Ointment 1 Application(s) Topical every 6 hours PRN Perineal discomfort  diphenhydrAMINE 25 milliGRAM(s) Oral every 6 hours PRN Pruritus  glycerin Suppository - Adult 1 Suppository(s) Rectal at bedtime PRN Constipation  hydrocortisone 1% Cream 1 Application(s) Topical every 6 hours PRN Moderate Pain (4-6)  lanolin Ointment 1 Application(s) Topical every 6 hours PRN nipple soreness  magnesium hydroxide Suspension 30 milliLiter(s) Oral two times a day PRN Constipation  oxyCODONE    IR 5 milliGRAM(s) Oral every 3 hours PRN Moderate to Severe Pain (4-10)  oxyCODONE    IR 5 milliGRAM(s) Oral once PRN Moderate to Severe Pain (4-10)  pramoxine 1%/zinc 5% Cream 1 Application(s) Topical every 4 hours PRN Moderate Pain (4-6)  simethicone 80 milliGRAM(s) Chew every 4 hours PRN Gas  witch hazel Pads 1 Application(s) Topical every 4 hours PRN Perineal discomfort        CAPILLARY BLOOD GLUCOSE        I&O's Summary    2020 07:  -  2020 07:00  --------------------------------------------------------  IN: 1750 mL / OUT: 1450 mL / NET: 300 mL    2020 07:  -  2020 14:17  --------------------------------------------------------  IN: 450 mL / OUT: 400 mL / NET: 50 mL        PHYSICAL EXAM:  GENERAL: NAD, well-developed  HEAD:  Atraumatic, Normocephalic  EYES: EOMI, PERRLA, conjunctiva and sclera clear  NECK: Supple, No JVD  CHEST/LUNG: Clear to auscultation bilaterally; No wheeze  HEART: Regular rate and rhythm; No murmurs, rubs, or gallops  ABDOMEN: Soft, Nontender, Nondistended; Bowel sounds present  EXTREMITIES:  2+ Peripheral Pulses, No clubbing, cyanosis, or edema  PSYCH: AAOx3  NEUROLOGY: non-focal  SKIN: No rashes or lesions    LABS:                        12.2   13.69 )-----------( 203      ( 2020 07:28 )             34.7     01-08    123<L>  |  90<L>  |  7   ----------------------------<  97  3.7   |  18<L>  |  0.49<L>    Ca    7.5<L>      2020 07:28  Mg     5.6     01-08    TPro  6.6  /  Alb  3.7  /  TBili  0.6  /  DBili  x   /  AST  23  /  ALT  14  /  AlkPhos  237<H>  01-08    PT/INR - ( 2020 07:28 )   PT: 9.9 SEC;   INR: 0.87          PTT - ( 2020 02:00 )  PTT:25.0 SEC      Urinalysis Basic - ( 2020 15:20 )    Color: YELLOW / Appearance: Lt TURBID / S.012 / pH: 7.5  Gluc: NEGATIVE / Ketone: TRACE  / Bili: NEGATIVE / Urobili: NORMAL   Blood: SMALL / Protein: 10 / Nitrite: NEGATIVE   Leuk Esterase: NEGATIVE / RBC: 0-2 / WBC 11-25   Sq Epi: SMALL / Non Sq Epi: x / Bacteria: MANY        RADIOLOGY & ADDITIONAL TESTS:    Imaging Personally Reviewed:    Consultant(s) Notes Reviewed:      Care Discussed with Consultants/Other Providers: Consult Note Internal Medicine  HPI:    38 yo white female  G1 O0 @ 40 wks GA now s/p  this AM; course c/b pre-eclampsia with severe features on magnesium sulfate.    No concurrent medical conditions or medications. No N/V/D. Epidural removed this AM s/p delivery. Pain controlled; patient has yet to void.    Medicine consulted for hyponatremia; admission 126-->123. Patient receiving 50cc/hr NS. Fluid restriction initiated this AM.         PAST MEDICAL & SURGICAL HISTORY:  No pertinent past medical history  No significant past surgical history      Allergies    No Known Allergies    Intolerances        MEDICATIONS  (STANDING):  acetaminophen   Tablet .. 975 milliGRAM(s) Oral <User Schedule>  diphtheria/tetanus/pertussis (acellular) Vaccine (ADAcel) 0.5 milliLiter(s) IntraMuscular once  ibuprofen  Tablet. 600 milliGRAM(s) Oral every 6 hours  labetalol 200 milliGRAM(s) Oral three times a day  magnesium sulfate Infusion 2 Gm/Hr (50 mL/Hr) IV Continuous <Continuous>  oxytocin Infusion 333.333 milliUNIT(s)/Min (999.999 mL/Hr) IV Continuous <Continuous>  prenatal multivitamin 1 Tablet(s) Oral daily  sodium chloride 0.9% lock flush 3 milliLiter(s) IV Push every 8 hours  sodium chloride 0.9%. 1000 milliLiter(s) (50 mL/Hr) IV Continuous <Continuous>    MEDICATIONS  (PRN):  benzocaine 20%/menthol 0.5% Spray 1 Spray(s) Topical every 6 hours PRN for Perineal discomfort  dibucaine 1% Ointment 1 Application(s) Topical every 6 hours PRN Perineal discomfort  diphenhydrAMINE 25 milliGRAM(s) Oral every 6 hours PRN Pruritus  glycerin Suppository - Adult 1 Suppository(s) Rectal at bedtime PRN Constipation  hydrocortisone 1% Cream 1 Application(s) Topical every 6 hours PRN Moderate Pain (4-6)  lanolin Ointment 1 Application(s) Topical every 6 hours PRN nipple soreness  magnesium hydroxide Suspension 30 milliLiter(s) Oral two times a day PRN Constipation  oxyCODONE    IR 5 milliGRAM(s) Oral every 3 hours PRN Moderate to Severe Pain (4-10)  oxyCODONE    IR 5 milliGRAM(s) Oral once PRN Moderate to Severe Pain (4-10)  pramoxine 1%/zinc 5% Cream 1 Application(s) Topical every 4 hours PRN Moderate Pain (4-6)  simethicone 80 milliGRAM(s) Chew every 4 hours PRN Gas  witch hazel Pads 1 Application(s) Topical every 4 hours PRN Perineal discomfort        CAPILLARY BLOOD GLUCOSE        I&O's Summary    2020 07:01  -  2020 07:00  --------------------------------------------------------  IN: 1750 mL / OUT: 1450 mL / NET: 300 mL    2020 07:01  -  2020 14:17  --------------------------------------------------------  IN: 450 mL / OUT: 400 mL / NET: 50 mL        PHYSICAL EXAM:  GENERAL: No acute distress  HEAD:  Atraumatic, Normocephalic  EYES: EOMI, PERRLA, conjunctiva and sclera clear  NECK: Supple, No JVD  CHEST/LUNG: Clear to auscultation bilaterally; No wheeze  HEART: Regular rate and rhythm; No murmurs, rubs, or gallops  ABDOMEN: Soft, Nontender, Nondistended; Bowel sounds present  EXTREMITIES:  2+ Peripheral Pulses, No clubbing, cyanosis, or edema  PSYCH: AAOx3  NEUROLOGY: non-focal  SKIN: No rashes or lesions    LABS:                        12.2   13.69 )-----------( 203      ( 2020 07:28 )             34.7     01-08    123<L>  |  90<L>  |  7   ----------------------------<  97  3.7   |  18<L>  |  0.49<L>    Ca    7.5<L>      2020 07:28  Mg     5.6     01-08    TPro  6.6  /  Alb  3.7  /  TBili  0.6  /  DBili  x   /  AST  23  /  ALT  14  /  AlkPhos  237<H>  -08    PT/INR - ( 2020 07:28 )   PT: 9.9 SEC;   INR: 0.87          PTT - ( 2020 02:00 )  PTT:25.0 SEC      Urinalysis Basic - ( 2020 15:20 )    Color: YELLOW / Appearance: Lt TURBID / S.012 / pH: 7.5  Gluc: NEGATIVE / Ketone: TRACE  / Bili: NEGATIVE / Urobili: NORMAL   Blood: SMALL / Protein: 10 / Nitrite: NEGATIVE   Leuk Esterase: NEGATIVE / RBC: 0-2 / WBC 11-25   Sq Epi: SMALL / Non Sq Epi: x / Bacteria: MANY Consult Note Internal Medicine  HPI:    38 yo white female  G1 O0 @ 40 wks GA now s/p  this AM; course c/b pre-eclampsia with severe features on magnesium sulfate.    No concurrent medical conditions or medications. No N/V/D. Epidural removed this AM s/p delivery. Pain controlled; patient has yet to void.    Medicine consulted for hyponatremia; admission 126-->123. Patient receiving 50cc/hr NS. Fluid restriction initiated this AM.         PAST MEDICAL & SURGICAL HISTORY:  No pertinent past medical history  No significant past surgical history      Allergies    No Known Allergies    Intolerances        MEDICATIONS  (STANDING):  acetaminophen   Tablet .. 975 milliGRAM(s) Oral <User Schedule>  diphtheria/tetanus/pertussis (acellular) Vaccine (ADAcel) 0.5 milliLiter(s) IntraMuscular once  ibuprofen  Tablet. 600 milliGRAM(s) Oral every 6 hours  labetalol 200 milliGRAM(s) Oral three times a day  magnesium sulfate Infusion 2 Gm/Hr (50 mL/Hr) IV Continuous <Continuous>  oxytocin Infusion 333.333 milliUNIT(s)/Min (999.999 mL/Hr) IV Continuous <Continuous>  prenatal multivitamin 1 Tablet(s) Oral daily  sodium chloride 0.9% lock flush 3 milliLiter(s) IV Push every 8 hours  sodium chloride 0.9%. 1000 milliLiter(s) (50 mL/Hr) IV Continuous <Continuous>    MEDICATIONS  (PRN):  benzocaine 20%/menthol 0.5% Spray 1 Spray(s) Topical every 6 hours PRN for Perineal discomfort  dibucaine 1% Ointment 1 Application(s) Topical every 6 hours PRN Perineal discomfort  diphenhydrAMINE 25 milliGRAM(s) Oral every 6 hours PRN Pruritus  glycerin Suppository - Adult 1 Suppository(s) Rectal at bedtime PRN Constipation  hydrocortisone 1% Cream 1 Application(s) Topical every 6 hours PRN Moderate Pain (4-6)  lanolin Ointment 1 Application(s) Topical every 6 hours PRN nipple soreness  magnesium hydroxide Suspension 30 milliLiter(s) Oral two times a day PRN Constipation  oxyCODONE    IR 5 milliGRAM(s) Oral every 3 hours PRN Moderate to Severe Pain (4-10)  oxyCODONE    IR 5 milliGRAM(s) Oral once PRN Moderate to Severe Pain (4-10)  pramoxine 1%/zinc 5% Cream 1 Application(s) Topical every 4 hours PRN Moderate Pain (4-6)  simethicone 80 milliGRAM(s) Chew every 4 hours PRN Gas  witch hazel Pads 1 Application(s) Topical every 4 hours PRN Perineal discomfort        CAPILLARY BLOOD GLUCOSE        I&O's Summary    2020 07:01  -  2020 07:00  --------------------------------------------------------  IN: 1750 mL / OUT: 1450 mL / NET: 300 mL    2020 07:01  -  2020 14:17  --------------------------------------------------------  IN: 450 mL / OUT: 400 mL / NET: 50 mL        PHYSICAL EXAM:  Vital Signs Last 24 Hrs  T(C): 36.4 (2020 15:50), Max: 37.1 (2020 10:48)  T(F): 97.6 (2020 15:50), Max: 98.8 (2020 10:48)  HR: 78 (2020 15:50) (57 - 181)  BP: 123/58 (2020 15:50) (94/51 - 193/74)  BP(mean): --  RR: 16 (2020 15:50) (14 - 16)  SpO2: 100% (2020 15:50) (74% - 100%)  GENERAL: No acute distress  HEAD:  Atraumatic, Normocephalic  EYES: EOMI, PERRLA, conjunctiva and sclera clear  NECK: Supple, No JVD  CHEST/LUNG: Clear to auscultation bilaterally; No wheeze  HEART: Regular rate and rhythm; No murmurs, rubs, or gallops  ABDOMEN: Soft, Nontender, Nondistended; Bowel sounds present  EXTREMITIES:  2+ Peripheral Pulses, No clubbing, cyanosis, or edema  PSYCH: AAOx3  NEUROLOGY: non-focal  SKIN: No rashes or lesions    LABS:                        12.2   13.69 )-----------( 203      ( 2020 07:28 )             34.7     01-08    123<L>  |  90<L>  |  7   ----------------------------<  97  3.7   |  18<L>  |  0.49<L>    Ca    7.5<L>      2020 07:28  Mg     5.6     01-08    TPro  6.6  /  Alb  3.7  /  TBili  0.6  /  DBili  x   /  AST  23  /  ALT  14  /  AlkPhos  237<H>  01-08    PT/INR - ( 2020 07:28 )   PT: 9.9 SEC;   INR: 0.87          PTT - ( 2020 02:00 )  PTT:25.0 SEC      Urinalysis Basic - ( 2020 15:20 )    Color: YELLOW / Appearance: Lt TURBID / S.012 / pH: 7.5  Gluc: NEGATIVE / Ketone: TRACE  / Bili: NEGATIVE / Urobili: NORMAL   Blood: SMALL / Protein: 10 / Nitrite: NEGATIVE   Leuk Esterase: NEGATIVE / RBC: 0-2 / WBC 11-25   Sq Epi: SMALL / Non Sq Epi: x / Bacteria: MANY

## 2020-01-08 NOTE — LACTATION INITIAL EVALUATION - LACTATION INTERVENTIONS
initiate hand expression routine/initiate skin to skin/Instructed and assisted with positioning to facilitate proper latch.  Infant is less than 24 hours old and reluctant to latch at this time.  Safe skin to skin with frequent attempts encouraged.  [place] feeding pattern and stomach capacity reviewed with patient.  Hand expression taught.  Feeding cues reviewed.  Encouraged to call for assistance, as needed.  Primary RN made aware of consult and plan.

## 2020-01-08 NOTE — OB RN DELIVERY SUMMARY - NS_SEPSISRSKCALC_OBGYN_ALL_OB_FT
No gestational age at birth has been documented. Ensure delivery date/time has been entered above. EOS calculated successfully. EOS Risk Factor: 0.5/1000 live births ([facility] national incidence); GA=40w1d; Temp=98.8; ROM=2.55; GBS='Negative'; Antibiotics='No antibiotics or any antibiotics < 2 hrs prior to birth'

## 2020-01-08 NOTE — DISCHARGE NOTE OB - PATIENT PORTAL LINK FT
You can access the FollowMyHealth Patient Portal offered by [facility] by registering at the following website: [email or web address]. By joining [employer]’s FollowMyHealth portal, you will also be able to view your health information using other applications (apps) compatible with our system.

## 2020-01-08 NOTE — CONSULT NOTE ADULT - ATTENDING COMMENTS
Fluid restriction  Oral sodium chloride tablets if unsuccessful  For seizure activity give 1.5% sodium IV over 1 hr  Consult medicine   Sodium levels may take up to 36 hours to return to normal after delivery
Pt seen and examined. She is asymptomatic. Likely multifactorial in the setting of current ivfs +/- pain (however pt reports well controlled) +/- SIADH.     Agree with plan above:  -hold further ivfs for now   -monitor urine output. Pt has not urinated for hours which is common after epidural  - send urine osmolarity, serum osmolarity, urine sodium, TSH    -Fluid restriction to 1.0 L/day for now   -Repeat BMP today   -will hold off on sodium tabs for now and reassess tomorrow

## 2020-01-08 NOTE — PROVIDER CONTACT NOTE (OTHER) - ASSESSMENT
Patient ambulated to bathroom with steady gait, Denies any headaches, blurry vision, epigastric pain or dizziness at this time. Unable to void at this time despite multiple attempts.   Laying BP 99/53 HR 65   Sitting 122/59 HR 69   Standing 94/52 HR 81  HELLP labs sent, Thyroid panel, Cortisol and Osmolarity labs sent as per medicine results pending

## 2020-01-08 NOTE — CONSULT NOTE ADULT - ASSESSMENT
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   -If any acute mental status changes or seizure activity, recommend hypertonic saline and MICU consult    d/w Attg Dr. 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

## 2020-01-08 NOTE — DISCHARGE NOTE OB - SECONDARY DIAGNOSIS.
Pre-existing essential hypertension during pregnancy, antepartum Oligohydramnios in third trimester, single or unspecified fetus

## 2020-01-08 NOTE — DISCHARGE NOTE OB - MEDICATION SUMMARY - MEDICATIONS TO TAKE
I will START or STAY ON the medications listed below when I get home from the hospital:    acetaminophen 325 mg oral tablet  -- 3 tab(s) by mouth   -- Indication: For as needed for pain    ibuprofen 600 mg oral tablet  -- 1 tab(s) by mouth every 6 hours  -- Indication: For as needed for pain    labetalol 200 mg oral tablet  -- 1 tab(s) by mouth 3 times a day  -- Indication: For to control high blood pressure

## 2020-01-08 NOTE — DISCHARGE NOTE OB - CARE PROVIDER_API CALL
Ijeoma Obrien)  Obstetrics and Gynecology  [address], [address]  [place] [address]  Phone: [phone]  Fax: [phone]  Follow Up Time: 1 week

## 2020-01-09 LAB
ALBUMIN SERPL ELPH-MCNC: 3 G/DL — LOW (ref 3.3–5)
ALBUMIN SERPL ELPH-MCNC: 3 G/DL — LOW (ref 3.3–5)
ALBUMIN SERPL ELPH-MCNC: 3.4 G/DL — SIGNIFICANT CHANGE UP (ref 3.3–5)
ALP SERPL-CCNC: 164 U/L — HIGH (ref 40–120)
ALP SERPL-CCNC: 173 U/L — HIGH (ref 40–120)
ALP SERPL-CCNC: 176 U/L — HIGH (ref 40–120)
ALT FLD-CCNC: 12 U/L — SIGNIFICANT CHANGE UP (ref 4–33)
ALT FLD-CCNC: 14 U/L — SIGNIFICANT CHANGE UP (ref 4–33)
ALT FLD-CCNC: 16 U/L — SIGNIFICANT CHANGE UP (ref 4–33)
ANION GAP SERPL CALC-SCNC: 8 MMO/L — SIGNIFICANT CHANGE UP (ref 7–14)
ANION GAP SERPL CALC-SCNC: 9 MMO/L — SIGNIFICANT CHANGE UP (ref 7–14)
ANION GAP SERPL CALC-SCNC: 9 MMO/L — SIGNIFICANT CHANGE UP (ref 7–14)
APPEARANCE UR: CLEAR — SIGNIFICANT CHANGE UP
APTT BLD: 23.6 SEC — LOW (ref 27.5–36.3)
APTT BLD: 23.6 SEC — LOW (ref 27.5–36.3)
APTT BLD: 24.4 SEC — LOW (ref 27.5–36.3)
AST SERPL-CCNC: 31 U/L — SIGNIFICANT CHANGE UP (ref 4–32)
AST SERPL-CCNC: 33 U/L — HIGH (ref 4–32)
AST SERPL-CCNC: 36 U/L — HIGH (ref 4–32)
BACTERIA # UR AUTO: NEGATIVE — SIGNIFICANT CHANGE UP
BASOPHILS # BLD AUTO: 0.02 K/UL — SIGNIFICANT CHANGE UP (ref 0–0.2)
BASOPHILS # BLD AUTO: 0.03 K/UL — SIGNIFICANT CHANGE UP (ref 0–0.2)
BASOPHILS # BLD AUTO: 0.03 K/UL — SIGNIFICANT CHANGE UP (ref 0–0.2)
BASOPHILS NFR BLD AUTO: 0.2 % — SIGNIFICANT CHANGE UP (ref 0–2)
BASOPHILS NFR BLD AUTO: 0.2 % — SIGNIFICANT CHANGE UP (ref 0–2)
BASOPHILS NFR BLD AUTO: 0.3 % — SIGNIFICANT CHANGE UP (ref 0–2)
BILIRUB SERPL-MCNC: 0.2 MG/DL — SIGNIFICANT CHANGE UP (ref 0.2–1.2)
BILIRUB SERPL-MCNC: 0.2 MG/DL — SIGNIFICANT CHANGE UP (ref 0.2–1.2)
BILIRUB SERPL-MCNC: < 0.2 MG/DL — LOW (ref 0.2–1.2)
BILIRUB UR-MCNC: NEGATIVE — SIGNIFICANT CHANGE UP
BLOOD UR QL VISUAL: HIGH
BUN SERPL-MCNC: 5 MG/DL — LOW (ref 7–23)
BUN SERPL-MCNC: 7 MG/DL — SIGNIFICANT CHANGE UP (ref 7–23)
BUN SERPL-MCNC: 7 MG/DL — SIGNIFICANT CHANGE UP (ref 7–23)
CALCIUM SERPL-MCNC: 6.3 MG/DL — CRITICAL LOW (ref 8.4–10.5)
CALCIUM SERPL-MCNC: 6.4 MG/DL — CRITICAL LOW (ref 8.4–10.5)
CALCIUM SERPL-MCNC: 7.3 MG/DL — LOW (ref 8.4–10.5)
CHLORIDE SERPL-SCNC: 100 MMOL/L — SIGNIFICANT CHANGE UP (ref 98–107)
CHLORIDE SERPL-SCNC: 100 MMOL/L — SIGNIFICANT CHANGE UP (ref 98–107)
CHLORIDE SERPL-SCNC: 98 MMOL/L — SIGNIFICANT CHANGE UP (ref 98–107)
CO2 SERPL-SCNC: 23 MMOL/L — SIGNIFICANT CHANGE UP (ref 22–31)
CO2 SERPL-SCNC: 24 MMOL/L — SIGNIFICANT CHANGE UP (ref 22–31)
CO2 SERPL-SCNC: 25 MMOL/L — SIGNIFICANT CHANGE UP (ref 22–31)
COLOR SPEC: COLORLESS — SIGNIFICANT CHANGE UP
CREAT ?TM UR-MCNC: 17 MG/DL — SIGNIFICANT CHANGE UP
CREAT SERPL-MCNC: 0.49 MG/DL — LOW (ref 0.5–1.3)
CREAT SERPL-MCNC: 0.51 MG/DL — SIGNIFICANT CHANGE UP (ref 0.5–1.3)
CREAT SERPL-MCNC: 0.56 MG/DL — SIGNIFICANT CHANGE UP (ref 0.5–1.3)
EOSINOPHIL # BLD AUTO: 0.02 K/UL — SIGNIFICANT CHANGE UP (ref 0–0.5)
EOSINOPHIL # BLD AUTO: 0.02 K/UL — SIGNIFICANT CHANGE UP (ref 0–0.5)
EOSINOPHIL # BLD AUTO: 0.04 K/UL — SIGNIFICANT CHANGE UP (ref 0–0.5)
EOSINOPHIL NFR BLD AUTO: 0.2 % — SIGNIFICANT CHANGE UP (ref 0–6)
EOSINOPHIL NFR BLD AUTO: 0.2 % — SIGNIFICANT CHANGE UP (ref 0–6)
EOSINOPHIL NFR BLD AUTO: 0.3 % — SIGNIFICANT CHANGE UP (ref 0–6)
FIBRINOGEN PPP-MCNC: 551.6 MG/DL — HIGH (ref 350–510)
FIBRINOGEN PPP-MCNC: 601.5 MG/DL — HIGH (ref 350–510)
FIBRINOGEN PPP-MCNC: 686 MG/DL — HIGH (ref 350–510)
GLUCOSE SERPL-MCNC: 100 MG/DL — HIGH (ref 70–99)
GLUCOSE SERPL-MCNC: 85 MG/DL — SIGNIFICANT CHANGE UP (ref 70–99)
GLUCOSE SERPL-MCNC: 90 MG/DL — SIGNIFICANT CHANGE UP (ref 70–99)
GLUCOSE UR-MCNC: NEGATIVE — SIGNIFICANT CHANGE UP
HCT VFR BLD CALC: 30.2 % — LOW (ref 34.5–45)
HCT VFR BLD CALC: 31.4 % — LOW (ref 34.5–45)
HCT VFR BLD CALC: 32.2 % — LOW (ref 34.5–45)
HGB BLD-MCNC: 10.4 G/DL — LOW (ref 11.5–15.5)
HGB BLD-MCNC: 10.7 G/DL — LOW (ref 11.5–15.5)
HGB BLD-MCNC: 10.8 G/DL — LOW (ref 11.5–15.5)
IMM GRANULOCYTES NFR BLD AUTO: 0.3 % — SIGNIFICANT CHANGE UP (ref 0–1.5)
IMM GRANULOCYTES NFR BLD AUTO: 0.4 % — SIGNIFICANT CHANGE UP (ref 0–1.5)
IMM GRANULOCYTES NFR BLD AUTO: 0.4 % — SIGNIFICANT CHANGE UP (ref 0–1.5)
INR BLD: 0.85 — LOW (ref 0.88–1.17)
INR BLD: 0.85 — LOW (ref 0.88–1.17)
INR BLD: 0.87 — LOW (ref 0.88–1.17)
KETONES UR-MCNC: NEGATIVE — SIGNIFICANT CHANGE UP
LDH SERPL L TO P-CCNC: 282 U/L — HIGH (ref 135–225)
LDH SERPL L TO P-CCNC: 285 U/L — HIGH (ref 135–225)
LDH SERPL L TO P-CCNC: 338 U/L — HIGH (ref 135–225)
LEUKOCYTE ESTERASE UR-ACNC: SIGNIFICANT CHANGE UP
LYMPHOCYTES # BLD AUTO: 1.41 K/UL — SIGNIFICANT CHANGE UP (ref 1–3.3)
LYMPHOCYTES # BLD AUTO: 1.65 K/UL — SIGNIFICANT CHANGE UP (ref 1–3.3)
LYMPHOCYTES # BLD AUTO: 12.3 % — LOW (ref 13–44)
LYMPHOCYTES # BLD AUTO: 14.6 % — SIGNIFICANT CHANGE UP (ref 13–44)
LYMPHOCYTES # BLD AUTO: 16.4 % — SIGNIFICANT CHANGE UP (ref 13–44)
LYMPHOCYTES # BLD AUTO: 2.03 K/UL — SIGNIFICANT CHANGE UP (ref 1–3.3)
MAGNESIUM SERPL-MCNC: 5.8 MG/DL — HIGH (ref 1.6–2.6)
MCHC RBC-ENTMCNC: 32.9 PG — SIGNIFICANT CHANGE UP (ref 27–34)
MCHC RBC-ENTMCNC: 33 PG — SIGNIFICANT CHANGE UP (ref 27–34)
MCHC RBC-ENTMCNC: 33.4 PG — SIGNIFICANT CHANGE UP (ref 27–34)
MCHC RBC-ENTMCNC: 33.5 % — SIGNIFICANT CHANGE UP (ref 32–36)
MCHC RBC-ENTMCNC: 34.1 % — SIGNIFICANT CHANGE UP (ref 32–36)
MCHC RBC-ENTMCNC: 34.4 % — SIGNIFICANT CHANGE UP (ref 32–36)
MCV RBC AUTO: 96.9 FL — SIGNIFICANT CHANGE UP (ref 80–100)
MCV RBC AUTO: 97.1 FL — SIGNIFICANT CHANGE UP (ref 80–100)
MCV RBC AUTO: 98.2 FL — SIGNIFICANT CHANGE UP (ref 80–100)
MONOCYTES # BLD AUTO: 0.7 K/UL — SIGNIFICANT CHANGE UP (ref 0–0.9)
MONOCYTES # BLD AUTO: 0.78 K/UL — SIGNIFICANT CHANGE UP (ref 0–0.9)
MONOCYTES # BLD AUTO: 0.88 K/UL — SIGNIFICANT CHANGE UP (ref 0–0.9)
MONOCYTES NFR BLD AUTO: 6.2 % — SIGNIFICANT CHANGE UP (ref 2–14)
MONOCYTES NFR BLD AUTO: 6.8 % — SIGNIFICANT CHANGE UP (ref 2–14)
MONOCYTES NFR BLD AUTO: 7.1 % — SIGNIFICANT CHANGE UP (ref 2–14)
NEUTROPHILS # BLD AUTO: 8.89 K/UL — HIGH (ref 1.8–7.4)
NEUTROPHILS # BLD AUTO: 9.14 K/UL — HIGH (ref 1.8–7.4)
NEUTROPHILS # BLD AUTO: 9.34 K/UL — HIGH (ref 1.8–7.4)
NEUTROPHILS NFR BLD AUTO: 75.7 % — SIGNIFICANT CHANGE UP (ref 43–77)
NEUTROPHILS NFR BLD AUTO: 78.4 % — HIGH (ref 43–77)
NEUTROPHILS NFR BLD AUTO: 80 % — HIGH (ref 43–77)
NITRITE UR-MCNC: NEGATIVE — SIGNIFICANT CHANGE UP
NRBC # FLD: 0 K/UL — SIGNIFICANT CHANGE UP (ref 0–0)
PH UR: 7.5 — SIGNIFICANT CHANGE UP (ref 5–8)
PLATELET # BLD AUTO: 188 K/UL — SIGNIFICANT CHANGE UP (ref 150–400)
PLATELET # BLD AUTO: 192 K/UL — SIGNIFICANT CHANGE UP (ref 150–400)
PLATELET # BLD AUTO: 218 K/UL — SIGNIFICANT CHANGE UP (ref 150–400)
PMV BLD: 12 FL — SIGNIFICANT CHANGE UP (ref 7–13)
PMV BLD: 12 FL — SIGNIFICANT CHANGE UP (ref 7–13)
PMV BLD: 12.2 FL — SIGNIFICANT CHANGE UP (ref 7–13)
POTASSIUM SERPL-MCNC: 4.2 MMOL/L — SIGNIFICANT CHANGE UP (ref 3.5–5.3)
POTASSIUM SERPL-MCNC: 4.2 MMOL/L — SIGNIFICANT CHANGE UP (ref 3.5–5.3)
POTASSIUM SERPL-MCNC: 4.5 MMOL/L — SIGNIFICANT CHANGE UP (ref 3.5–5.3)
POTASSIUM SERPL-SCNC: 4.2 MMOL/L — SIGNIFICANT CHANGE UP (ref 3.5–5.3)
POTASSIUM SERPL-SCNC: 4.2 MMOL/L — SIGNIFICANT CHANGE UP (ref 3.5–5.3)
POTASSIUM SERPL-SCNC: 4.5 MMOL/L — SIGNIFICANT CHANGE UP (ref 3.5–5.3)
PROT SERPL-MCNC: 5.3 G/DL — LOW (ref 6–8.3)
PROT SERPL-MCNC: 5.6 G/DL — LOW (ref 6–8.3)
PROT SERPL-MCNC: 6.1 G/DL — SIGNIFICANT CHANGE UP (ref 6–8.3)
PROT UR-MCNC: NEGATIVE — SIGNIFICANT CHANGE UP
PROTHROM AB SERPL-ACNC: 9.4 SEC — LOW (ref 9.8–13.1)
PROTHROM AB SERPL-ACNC: 9.6 SEC — LOW (ref 9.8–13.1)
PROTHROM AB SERPL-ACNC: 9.6 SEC — LOW (ref 9.8–13.1)
RBC # BLD: 3.11 M/UL — LOW (ref 3.8–5.2)
RBC # BLD: 3.24 M/UL — LOW (ref 3.8–5.2)
RBC # BLD: 3.28 M/UL — LOW (ref 3.8–5.2)
RBC # FLD: 12.1 % — SIGNIFICANT CHANGE UP (ref 10.3–14.5)
RBC # FLD: 12.3 % — SIGNIFICANT CHANGE UP (ref 10.3–14.5)
RBC # FLD: 12.4 % — SIGNIFICANT CHANGE UP (ref 10.3–14.5)
RBC CASTS # UR COMP ASSIST: >50 — HIGH (ref 0–?)
SODIUM SERPL-SCNC: 132 MMOL/L — LOW (ref 135–145)
SP GR SPEC: 1.01 — SIGNIFICANT CHANGE UP (ref 1–1.04)
SQUAMOUS # UR AUTO: SIGNIFICANT CHANGE UP
URATE SERPL-MCNC: 2.9 MG/DL — SIGNIFICANT CHANGE UP (ref 2.5–7)
URATE SERPL-MCNC: 3.1 MG/DL — SIGNIFICANT CHANGE UP (ref 2.5–7)
URATE SERPL-MCNC: 3.2 MG/DL — SIGNIFICANT CHANGE UP (ref 2.5–7)
UROBILINOGEN FLD QL: NORMAL — SIGNIFICANT CHANGE UP
WBC # BLD: 11.33 K/UL — HIGH (ref 3.8–10.5)
WBC # BLD: 11.42 K/UL — HIGH (ref 3.8–10.5)
WBC # BLD: 12.36 K/UL — HIGH (ref 3.8–10.5)
WBC # FLD AUTO: 11.33 K/UL — HIGH (ref 3.8–10.5)
WBC # FLD AUTO: 11.42 K/UL — HIGH (ref 3.8–10.5)
WBC # FLD AUTO: 12.36 K/UL — HIGH (ref 3.8–10.5)
WBC UR QL: HIGH (ref 0–?)

## 2020-01-09 RX ADMIN — SODIUM CHLORIDE 3 MILLILITER(S): 9 INJECTION INTRAMUSCULAR; INTRAVENOUS; SUBCUTANEOUS at 16:58

## 2020-01-09 RX ADMIN — SODIUM CHLORIDE 3 MILLILITER(S): 9 INJECTION INTRAMUSCULAR; INTRAVENOUS; SUBCUTANEOUS at 06:18

## 2020-01-09 RX ADMIN — SODIUM CHLORIDE 3 MILLILITER(S): 9 INJECTION INTRAMUSCULAR; INTRAVENOUS; SUBCUTANEOUS at 22:00

## 2020-01-09 RX ADMIN — Medication 200 MILLIGRAM(S): at 22:00

## 2020-01-09 NOTE — PROGRESS NOTE ADULT - SUBJECTIVE AND OBJECTIVE BOX
S: Patient doing well. Minimal lochia. Pain controlled. asymptomatic hypocalcemia and hyponatremia MgSo4 infusion discontinued this am around 10 am     O: Vital Signs Last 24 Hrs  T(C): 36.8 (2020 14:33), Max: 37 (2020 10:00)  T(F): 98.3 (2020 14:33), Max: 98.6 (2020 10:00)  HR: 76 (2020 14:33) (63 - 79)  BP: 122/67 (2020 14:33) (98/52 - 123/58)  BP(mean): --  RR: 17 (2020 14:33) (16 - 18)  SpO2: 100% (2020 14:33) (98% - 100%)    Gen: NAD  Abd: soft, NT, ND, fundus firm below umbilicus  Lochia: moderate  Ext: no tenderness    Labs:                        10.8   11.42 )-----------( 218      ( 2020 14:29 )             32.2       A: 39y PPD# s/p  doing well.    Plan: patient without complaints follow up repeat calcium and sodium both decreased with MgSo4. Plan to follow up after discontinuation of MG done this am

## 2020-01-09 NOTE — PROGRESS NOTE ADULT - PROBLEM SELECTOR PLAN 1
- Pain well controlled, continue current pain regimen  - Increase ambulation, SCDs when not ambulating'  - Follow up labs, medicine recs  - Continue labetalol  - Continue Mg 24 hrs PP  - Continue regular diet    Adelso PGY1

## 2020-01-09 NOTE — PROGRESS NOTE ADULT - SUBJECTIVE AND OBJECTIVE BOX
S: Patient doing well. Minimal lochia. Pain controlled.    O: Vital Signs Last 24 Hrs  T(C): 36.8 (10 Gordy 2020 05:55), Max: 37.1 (10 Gordy 2020 01:29)  T(F): 98.3 (10 Gordy 2020 05:55), Max: 98.8 (10 Gordy 2020 01:29)  HR: 79 (10 Gordy 2020 06:10) (59 - 91)  BP: 117/70 (10 Gordy 2020 06:10) (110/59 - 138/90)  BP(mean): --  RR: 16 (10 Gordy 2020 06:10) (16 - 18)  SpO2: 99% (10 Gordy 2020 06:10) (99% - 100%)    Gen: NAD  Abd: soft, NT, ND, fundus firm below umbilicus  Lochia: moderate  Ext: no tenderness    Labs:                        10.8   11.42 )-----------( 218      ( 2020 14:29 )             32.2       A: 39y PPD# s/p  doing well.    Plan: follow up calcium and sodium discharge planning if stable

## 2020-01-09 NOTE — PROGRESS NOTE ADULT - SUBJECTIVE AND OBJECTIVE BOX
OB Progress Note:  PPD#1    S: 40yo  PPD#1 s/p . Patient feels well. Pain is well controlled. She is tolerating a regular diet and passing flatus. She is voiding spontaneously, and ambulating without difficulty. Denies CP/SOB. Denies lightheadedness/dizziness. Denies N/V.    O:  Vitals:  Vital Signs Last 24 Hrs  T(C): 36.8 (2020 06:00), Max: 37.1 (2020 10:48)  T(F): 98.3 (2020 06:00), Max: 98.8 (2020 10:48)  HR: 66 (2020 06:00) (57 - 181)  BP: 117/75 (2020 06:00) (94/51 - 193/74)  BP(mean): --  RR: 17 (2020 06:00) (16 - 18)  SpO2: 100% (2020 06:00) (74% - 100%)    MEDICATIONS  (STANDING):  acetaminophen   Tablet .. 975 milliGRAM(s) Oral <User Schedule>  diphtheria/tetanus/pertussis (acellular) Vaccine (ADAcel) 0.5 milliLiter(s) IntraMuscular once  ibuprofen  Tablet. 600 milliGRAM(s) Oral every 6 hours  labetalol 200 milliGRAM(s) Oral three times a day  magnesium sulfate Infusion 2 Gm/Hr (50 mL/Hr) IV Continuous <Continuous>  oxytocin Infusion 333.333 milliUNIT(s)/Min (999.999 mL/Hr) IV Continuous <Continuous>  prenatal multivitamin 1 Tablet(s) Oral daily  sodium chloride 0.9% lock flush 3 milliLiter(s) IV Push every 8 hours  sodium chloride 0.9%. 1000 milliLiter(s) (30 mL/Hr) IV Continuous <Continuous>      Labs:  Blood type: A Positive  Rubella IgG: RPR: Negative                          10.4<L>   12.36<H> >-----------< 188    (  @ 02:12 )             30.2<L>                        10.8<L>   16.62<H> >-----------< 185    (  @ 13:56 )             31.1<L>                        12.2   13.69<H> >-----------< 203    (  07:28 )             34.7                        11.6   14.71<H> >-----------< 192    (  02:00 )             34.1<L>                        11.6   13.79<H> >-----------< 198    (  19:55 )             33.9<L>                        12.9   12.10<H> >-----------< 205    (  13:40 )             37.7    20 @ 02:12      132<L>  |  100  |  7   ----------------------------<  100<H>  4.2   |  23  |  0.49<L>    20 13:56      123<L>  |  88<L>  |  8   ----------------------------<  133<H>  4.1   |  21<L>  |  0.54    20 07:28      123<L>  |  90<L>  |  7   ----------------------------<  97  3.7   |  18<L>  |  0.49<L>    20 02:00      123<L>  |  91<L>  |  6<L>  ----------------------------<  89  4.3   |  21<L>  |  0.51    20 19:55      124<L>  |  92<L>  |  7   ----------------------------<  86  3.9   |  20<L>  |  0.49<L>    20 13:40      126<L>  |  92<L>  |  9   ----------------------------<  77  4.3   |  22  |  0.52        Ca    6.3<LL>      2020 02:12  Ca    7.1<L>      2020 13:56  Ca    7.5<L>      2020 07:28  Ca    7.6<L>      2020 02:00  Ca    8.1<L>      2020 19:55  Ca    9.9      2020 13:40  Mg     5.8<H>       Mg     6.0<H>       Mg     6.0<H>       Mg     5.6<H>       Mg     5.6<H>       Mg     4.7<H>         TPro  5.3<L>  /  Alb  3.0<L>  /  TBili  0.2  /  DBili  x   /  AST  31  /  ALT  12  /  AlkPhos  176<H>  20 @ 02:12  TPro  5.8<L>  /  Alb  3.4  /  TBili  0.6  /  DBili  x   /  AST  32  /  ALT  13  /  AlkPhos  202<H>  20 @ 13:56  TPro  6.6  /  Alb  3.7  /  TBili  0.6  /  DBili  x   /  AST  23  /  ALT  14  /  AlkPhos  237<H>  20 @ 07:28  TPro  6.1  /  Alb  3.6  /  TBili  0.4  /  DBili  x   /  AST  24  /  ALT  14  /  AlkPhos  222<H>  20 @ 02:00  TPro  6.4  /  Alb  3.6  /  TBili  0.3  /  DBili  x   /  AST  21  /  ALT  13  /  AlkPhos  231<H>  20 @ 19:55  TPro  7.0  /  Alb  4.0  /  TBili  0.4  /  DBili  x   /  AST  26  /  ALT  15  /  AlkPhos  239<H>  20 @ 13:40          Physical Exam:  General: NAD  Abdomen: soft, non-tender, non-distended, fundus firm  Vaginal: Lochia wnl  Extremities: No erythema/edema

## 2020-01-09 NOTE — PROGRESS NOTE ADULT - ASSESSMENT
A/P: 40yo PPD#1 s/p  c/b sPEC on Mg 24hrs PP also c/b hyponatremia, now improving. Pt consulted by medicine service, awaiting lab results.  Patient is stable and doing well post-partum.

## 2020-01-09 NOTE — PROVIDER CONTACT NOTE (CRITICAL VALUE NOTIFICATION) - SITUATION
Pt's routine PIH labs sent at 0212. Pt in her room resting comfortably in bed, with spouse and  at bedside. Pt asymptomatic.

## 2020-01-10 VITALS
HEART RATE: 75 BPM | TEMPERATURE: 98 F | SYSTOLIC BLOOD PRESSURE: 137 MMHG | RESPIRATION RATE: 18 BRPM | OXYGEN SATURATION: 99 % | DIASTOLIC BLOOD PRESSURE: 71 MMHG

## 2020-01-10 LAB
ANION GAP SERPL CALC-SCNC: 8 MMO/L — SIGNIFICANT CHANGE UP (ref 7–14)
BUN SERPL-MCNC: 7 MG/DL — SIGNIFICANT CHANGE UP (ref 7–23)
CALCIUM SERPL-MCNC: 8.5 MG/DL — SIGNIFICANT CHANGE UP (ref 8.4–10.5)
CHLORIDE SERPL-SCNC: 101 MMOL/L — SIGNIFICANT CHANGE UP (ref 98–107)
CO2 SERPL-SCNC: 22 MMOL/L — SIGNIFICANT CHANGE UP (ref 22–31)
CREAT SERPL-MCNC: 0.46 MG/DL — LOW (ref 0.5–1.3)
GLUCOSE SERPL-MCNC: 88 MG/DL — SIGNIFICANT CHANGE UP (ref 70–99)
POTASSIUM SERPL-MCNC: 3.9 MMOL/L — SIGNIFICANT CHANGE UP (ref 3.5–5.3)
POTASSIUM SERPL-SCNC: 3.9 MMOL/L — SIGNIFICANT CHANGE UP (ref 3.5–5.3)
SODIUM SERPL-SCNC: 131 MMOL/L — LOW (ref 135–145)

## 2020-01-10 RX ORDER — LABETALOL HCL 100 MG
1 TABLET ORAL
Qty: 42 | Refills: 0
Start: 2020-01-10 | End: 2020-01-23

## 2020-01-10 RX ADMIN — SODIUM CHLORIDE 3 MILLILITER(S): 9 INJECTION INTRAMUSCULAR; INTRAVENOUS; SUBCUTANEOUS at 05:40

## 2020-01-10 NOTE — PROGRESS NOTE ADULT - ASSESSMENT
A/P: 38yo PPD#2 s/p  c/b sPEC s/p Mg 24 hrs with well controlled BP PP and by hyponatremia intrapartum, now resolved.  Patient is stable and doing well post-partum.

## 2020-01-10 NOTE — PROGRESS NOTE ADULT - PROBLEM SELECTOR PLAN 1
- Pain well controlled, continue current pain regimen  - Continue labetalol  - Increase ambulation, SCDs when not ambulating  - Continue regular diet  - Discharge planning     Adelso PGY1

## 2020-01-10 NOTE — PROGRESS NOTE ADULT - SUBJECTIVE AND OBJECTIVE BOX
OB Progress Note:  PPD#2    S: 38yo PPD#2 s/p . Patient feels well. Pain is well controlled. She is tolerating a regular diet and passing flatus. She is voiding spontaneously, and ambulating without difficulty. Denies CP/SOB. Denies lightheadedness/dizziness. Denies N/V.    O:  Vitals:   Vital Signs Last 24 Hrs  T(C): 36.8 (10 Gordy 2020 05:55), Max: 37.1 (10 Gordy 2020 01:29)  T(F): 98.3 (10 Gordy 2020 05:55), Max: 98.8 (10 Gordy 2020 01:29)  HR: 79 (10 Gordy 2020 06:10) (59 - 91)  BP: 117/70 (10 Gordy 2020 06:10) (110/59 - 138/90)  BP(mean): --  RR: 16 (10 Gordy 2020 06:10) (16 - 18)  SpO2: 99% (10 Gordy 2020 06:10) (99% - 100%)    MEDICATIONS  (STANDING):  acetaminophen   Tablet .. 975 milliGRAM(s) Oral <User Schedule>  diphtheria/tetanus/pertussis (acellular) Vaccine (ADAcel) 0.5 milliLiter(s) IntraMuscular once  ibuprofen  Tablet. 600 milliGRAM(s) Oral every 6 hours  labetalol 200 milliGRAM(s) Oral three times a day  oxytocin Infusion 333.333 milliUNIT(s)/Min (999.999 mL/Hr) IV Continuous <Continuous>  prenatal multivitamin 1 Tablet(s) Oral daily  sodium chloride 0.9% lock flush 3 milliLiter(s) IV Push every 8 hours    MEDICATIONS  (PRN):  benzocaine 20%/menthol 0.5% Spray 1 Spray(s) Topical every 6 hours PRN for Perineal discomfort  dibucaine 1% Ointment 1 Application(s) Topical every 6 hours PRN Perineal discomfort  diphenhydrAMINE 25 milliGRAM(s) Oral every 6 hours PRN Pruritus  glycerin Suppository - Adult 1 Suppository(s) Rectal at bedtime PRN Constipation  hydrocortisone 1% Cream 1 Application(s) Topical every 6 hours PRN Moderate Pain (4-6)  lanolin Ointment 1 Application(s) Topical every 6 hours PRN nipple soreness  magnesium hydroxide Suspension 30 milliLiter(s) Oral two times a day PRN Constipation  oxyCODONE    IR 5 milliGRAM(s) Oral every 3 hours PRN Moderate to Severe Pain (4-10)  oxyCODONE    IR 5 milliGRAM(s) Oral once PRN Moderate to Severe Pain (4-10)  pramoxine 1%/zinc 5% Cream 1 Application(s) Topical every 4 hours PRN Moderate Pain (4-6)  simethicone 80 milliGRAM(s) Chew every 4 hours PRN Gas  witch hazel Pads 1 Application(s) Topical every 4 hours PRN Perineal discomfort      Labs:  Blood type: A Positive  Rubella IgG: RPR: Negative                          10.8<L>   11.42<H> >-----------< 218    (  @ 14:29 )             32.2<L>                        10.7<L>   11.33<H> >-----------< 192    (  @ 08:37 )             31.4<L>                        10.4<L>   12.36<H> >-----------< 188    (  02:12 )             30.2<L>                        10.8<L>   16.62<H> >-----------< 185    (  @ 13:56 )             31.1<L>                        12.2   13.69<H> >-----------< 203    (  07:28 )             34.7                        11.6   14.71<H> >-----------< 192    (  @ 02:00 )             34.1<L>                        11.6   13.79<H> >-----------< 198    (  @ 19:55 )             33.9<L>                        12.9   12.10<H> >-----------< 205    (  @ 13:40 )             37.7    01-10-20 @ 01:00      131<L>  |  101  |  7   ----------------------------<  88  3.9   |  22  |  0.46<L>    20 @ 14:29      132<L>  |  100  |  7   ----------------------------<  90  4.5   |  24  |  0.51    20 @ 08:37      132<L>  |  98  |  5<L>  ----------------------------<  85  4.2   |  25  |  0.56    20 @ 02:12      132<L>  |  100  |  7   ----------------------------<  100<H>  4.2   |  23  |  0.49<L>    20 @ 13:56      123<L>  |  88<L>  |  8   ----------------------------<  133<H>  4.1   |  21<L>  |  0.54    20 @ 07:28      123<L>  |  90<L>  |  7   ----------------------------<  97  3.7   |  18<L>  |  0.49<L>    20 @ 02:00      123<L>  |  91<L>  |  6<L>  ----------------------------<  89  4.3   |  21<L>  |  0.51    20 @ 19:55      124<L>  |  92<L>  |  7   ----------------------------<  86  3.9   |  20<L>  |  0.49<L>    20 @ 13:40      126<L>  |  92<L>  |  9   ----------------------------<  77  4.3   |  22  |  0.52        Ca    8.5      10 Gordy 2020 01:00  Ca    7.3<L>      2020 14:29  Ca    6.4<LL>      2020 08:37  Ca    6.3<LL>      2020 02:12  Ca    7.1<L>      2020 13:56  Ca    7.5<L>      2020 07:28  Ca    7.6<L>      2020 02:00  Ca    8.1<L>      2020 19:55  Ca    9.9      2020 13:40  Mg     5.8<H>       Mg     6.0<H>       Mg     6.0<H>       Mg     5.6<H>       Mg     5.6<H>       Mg     4.7<H>         TPro  6.1  /  Alb  3.4  /  TBili  < 0.2<L>  /  DBili  x   /  AST  36<H>  /  ALT  16  /  AlkPhos  173<H>  20 @ 14:29  TPro  5.6<L>  /  Alb  3.0<L>  /  TBili  0.2  /  DBili  x   /  AST  33<H>  /  ALT  14  /  AlkPhos  164<H>  20 @ 08:37  TPro  5.3<L>  /  Alb  3.0<L>  /  TBili  0.2  /  DBili  x   /  AST  31  /  ALT  12  /  AlkPhos  176<H>  20 @ 02:12  TPro  5.8<L>  /  Alb  3.4  /  TBili  0.6  /  DBili  x   /  AST  32  /  ALT  13  /  AlkPhos  202<H>  20 @ 13:56  TPro  6.6  /  Alb  3.7  /  TBili  0.6  /  DBili  x   /  AST  23  /  ALT  14  /  AlkPhos  237<H>  20 @ 07:28  TPro  6.1  /  Alb  3.6  /  TBili  0.4  /  DBili  x   /  AST  24  /  ALT  14  /  AlkPhos  222<H>  20 @ 02:00  TPro  6.4  /  Alb  3.6  /  TBili  0.3  /  DBili  x   /  AST  21  /  ALT  13  /  AlkPhos  231<H>  20 @ 19:55  TPro  7.0  /  Alb  4.0  /  TBili  0.4  /  DBili  x   /  AST  26  /  ALT  15  /  AlkPhos  239<H>  20 @ 13:40          Physical Exam:  General: NAD  Abdomen: soft, non-tender, non-distended, fundus firm  Vaginal: Lochia wnl  Extremities: No erythema/edema

## 2023-08-09 NOTE — OB RN TRIAGE NOTE - PAIN RATING/NUMBER SCALE (0-10): ACTIVITY
Reached back out to [facility] and was told they have everything they need to fulfill prescription.    3

## 2025-03-03 NOTE — DISCHARGE NOTE OB - CARE PROVIDERS DIRECT ADDRESSES
The procedure was performed independently
[email or web address]